# Patient Record
Sex: FEMALE | Race: WHITE
[De-identification: names, ages, dates, MRNs, and addresses within clinical notes are randomized per-mention and may not be internally consistent; named-entity substitution may affect disease eponyms.]

---

## 2019-12-17 ENCOUNTER — HOSPITAL ENCOUNTER (EMERGENCY)
Dept: HOSPITAL 47 - EC | Age: 84
Discharge: HOME | End: 2019-12-17
Payer: MEDICARE

## 2019-12-17 VITALS — DIASTOLIC BLOOD PRESSURE: 52 MMHG | SYSTOLIC BLOOD PRESSURE: 116 MMHG | HEART RATE: 79 BPM

## 2019-12-17 VITALS — RESPIRATION RATE: 16 BRPM | TEMPERATURE: 97.1 F

## 2019-12-17 DIAGNOSIS — N39.0: Primary | ICD-10-CM

## 2019-12-17 DIAGNOSIS — E11.649: ICD-10-CM

## 2019-12-17 DIAGNOSIS — Z79.4: ICD-10-CM

## 2019-12-17 LAB
ANION GAP SERPL CALC-SCNC: 7 MMOL/L
BASOPHILS # BLD AUTO: 0 K/UL (ref 0–0.2)
BASOPHILS NFR BLD AUTO: 1 %
BUN SERPL-SCNC: 26 MG/DL (ref 7–17)
CALCIUM SPEC-MCNC: 9.3 MG/DL (ref 8.4–10.2)
CHLORIDE SERPL-SCNC: 104 MMOL/L (ref 98–107)
CO2 SERPL-SCNC: 25 MMOL/L (ref 22–30)
EOSINOPHIL # BLD AUTO: 0.1 K/UL (ref 0–0.7)
EOSINOPHIL NFR BLD AUTO: 3 %
ERYTHROCYTE [DISTWIDTH] IN BLOOD BY AUTOMATED COUNT: 3.59 M/UL (ref 3.8–5.4)
ERYTHROCYTE [DISTWIDTH] IN BLOOD: 12.2 % (ref 11.5–15.5)
GLUCOSE BLD-MCNC: 107 MG/DL (ref 75–99)
GLUCOSE BLD-MCNC: 209 MG/DL (ref 75–99)
GLUCOSE SERPL-MCNC: 150 MG/DL (ref 74–99)
GLUCOSE UR QL: (no result)
HCT VFR BLD AUTO: 37.8 % (ref 34–46)
HGB BLD-MCNC: 12.3 GM/DL (ref 11.4–16)
HYALINE CASTS UR QL AUTO: 3 /LPF (ref 0–2)
LYMPHOCYTES # SPEC AUTO: 1 K/UL (ref 1–4.8)
LYMPHOCYTES NFR SPEC AUTO: 24 %
MCH RBC QN AUTO: 34.3 PG (ref 25–35)
MCHC RBC AUTO-ENTMCNC: 32.6 G/DL (ref 31–37)
MCV RBC AUTO: 105.3 FL (ref 80–100)
MONOCYTES # BLD AUTO: 0.2 K/UL (ref 0–1)
MONOCYTES NFR BLD AUTO: 5 %
NEUTROPHILS # BLD AUTO: 2.6 K/UL (ref 1.3–7.7)
NEUTROPHILS NFR BLD AUTO: 65 %
PH UR: 5.5 [PH] (ref 5–8)
PLATELET # BLD AUTO: 186 K/UL (ref 150–450)
POTASSIUM SERPL-SCNC: 4.5 MMOL/L (ref 3.5–5.1)
RBC UR QL: 2 /HPF (ref 0–5)
SODIUM SERPL-SCNC: 136 MMOL/L (ref 137–145)
SP GR UR: 1.01 (ref 1–1.03)
SQUAMOUS UR QL AUTO: 1 /HPF (ref 0–4)
UROBILINOGEN UR QL STRIP: <2 MG/DL (ref ?–2)
WBC # BLD AUTO: 3.9 K/UL (ref 3.8–10.6)
WBC # UR AUTO: 30 /HPF (ref 0–5)

## 2019-12-17 PROCEDURE — 36415 COLL VENOUS BLD VENIPUNCTURE: CPT

## 2019-12-17 PROCEDURE — 81001 URINALYSIS AUTO W/SCOPE: CPT

## 2019-12-17 PROCEDURE — 85025 COMPLETE CBC W/AUTO DIFF WBC: CPT

## 2019-12-17 PROCEDURE — 80048 BASIC METABOLIC PNL TOTAL CA: CPT

## 2019-12-17 PROCEDURE — 96374 THER/PROPH/DIAG INJ IV PUSH: CPT

## 2019-12-17 PROCEDURE — 99285 EMERGENCY DEPT VISIT HI MDM: CPT

## 2019-12-17 PROCEDURE — 87086 URINE CULTURE/COLONY COUNT: CPT

## 2019-12-17 NOTE — ED
General Adult HPI





- General


Chief complaint: Recheck/Abnormal Lab/Rx


Stated complaint: hypoglycemia


Time Seen by Provider: 12/17/19 17:32


Source: EMS


Mode of arrival: EMS


Limitations: no limitations





- History of Present Illness


Initial comments: 


Dictation was produced using dragon dictation software. please excuse any 

grammatical, word or spelling errors. 





Chief Complaint: 84-year-old female presents with hyperglycemia.





History of Present Illness: 4-year-old female she has past medical history of 

diabetes.  She is relying on insulin.  Patient takes 8 units of Humalog 3 times 

a day.  Patient also takes 12 units of Lantus at night daily.  Patient states 

she felt very sick earlier this afternoon.  Patient was cleaning the house.  P

atient usually does not exert herself.  She did not think that her sugar was 

low.  EMS was called after the patient's glucose level was measured to be 46.  

Patient states she does not have any other symptoms.  She reports that this 

episode that she is feeling at baseline.








The ROS documented in this emergency department record has been reviewed and 

confirmed by me.  Those systems with pertinent positive or negative responses 

have been documented in the HPI.  All other systems are other negative and/or 

noncontributory.








PHYSICAL EXAM:


General Impression: Alert and oriented x3, not in acute distress


HEENT: Normocephalic atraumatic, extra-ocular movements intact, pupils equal and

reactive to light bilaterally, mucous membranes moist.


Cardiovascular: Heart regular rate and rhythm, S1&S2 audible, no murmurs, rubs 

or gallops


Chest: Lungs clear to auscultation bilaterally, no rhonchi, no wheeze, no rales


Abdomen: Bowel sounds present, abdomen soft, non-tender, non-distended, no 

organomegaly


Musculoskeletal: Pulses present and equal in all extremities, no peripheral 

edema


Motor:  no focal deficits noted


Neurological: CN II-XII grossly intact, no focal motor or sensory deficits noted


Skin: Intact with no visualized rashes


Psych: Normal affect and mood





ED course: 84-year-old female presents with hypoglycemia.  Likely secondary to 

poor intake after insulin administration.  Vital signs upon arrival are within 

acceptable limits.  Patient tolerating by mouth.


Laboratory evaluation obtained.  CBC, metabolic panel unremarkable.  Serial 

blood glucose measurements are within acceptable limits.  Urinalysis consistent 

with urinary tract infection.  Patient given 1 g of Rocephin IV push.  Patient 

prescription for Keflex.  Patient clear for discharge.  Her sugars are stable.  

Patient is well-appearing understandable and agreeable to disposition.











- Related Data


                                  Previous Rx's











 Medication  Instructions  Recorded


 


Cephalexin [Keflex] 500 mg PO Q6HR 7 Days #28 cap 12/17/19











                                    Allergies











Allergy/AdvReac Type Severity Reaction Status Date / Time


 


No Known Allergies Allergy   Verified 12/17/19 17:31














Review of Systems


ROS Statement: 


Those systems with pertinent positive or pertinent negative responses have been 

documented in the HPI.





ROS Other: All systems not noted in ROS Statement are negative.





Past Medical History


Past Medical History: Diabetes Mellitus, Hyperlipidemia, Hypertension


History of Any Multi-Drug Resistant Organisms: None Reported


Past Surgical History: Tonsillectomy


Past Psychological History: No Psychological Hx Reported


Smoking Status: Never smoker


Past Alcohol Use History: None Reported


Past Drug Use History: None Reported





General Exam


Limitations: no limitations





Course


                                   Vital Signs











  12/17/19





  17:27


 


Temperature 97.1 F L


 


Pulse Rate 85


 


Respiratory 16





Rate 


 


Blood Pressure 105/77


 


O2 Sat by Pulse 100





Oximetry 














Medical Decision Making





- Lab Data


Result diagrams: 


                                 12/17/19 17:45





                                 12/17/19 17:45


                                   Lab Results











  12/17/19 12/17/19 12/17/19 Range/Units





  17:40 17:45 17:45 


 


WBC    3.9  (3.8-10.6)  k/uL


 


RBC    3.59 L  (3.80-5.40)  m/uL


 


Hgb    12.3  (11.4-16.0)  gm/dL


 


Hct    37.8  (34.0-46.0)  %


 


MCV    105.3 H  (80.0-100.0)  fL


 


MCH    34.3  (25.0-35.0)  pg


 


MCHC    32.6  (31.0-37.0)  g/dL


 


RDW    12.2  (11.5-15.5)  %


 


Plt Count    186  (150-450)  k/uL


 


Neutrophils %    65  %


 


Lymphocytes %    24  %


 


Monocytes %    5  %


 


Eosinophils %    3  %


 


Basophils %    1  %


 


Neutrophils #    2.6  (1.3-7.7)  k/uL


 


Lymphocytes #    1.0  (1.0-4.8)  k/uL


 


Monocytes #    0.2  (0-1.0)  k/uL


 


Eosinophils #    0.1  (0-0.7)  k/uL


 


Basophils #    0.0  (0-0.2)  k/uL


 


Macrocytosis    Slight  


 


Sodium   136 L   (137-145)  mmol/L


 


Potassium   4.5   (3.5-5.1)  mmol/L


 


Chloride   104   ()  mmol/L


 


Carbon Dioxide   25   (22-30)  mmol/L


 


Anion Gap   7   mmol/L


 


BUN   26 H   (7-17)  mg/dL


 


Creatinine   0.91   (0.52-1.04)  mg/dL


 


Est GFR (CKD-EPI)AfAm   67   (>60 ml/min/1.73 sqM)  


 


Est GFR (CKD-EPI)NonAf   58   (>60 ml/min/1.73 sqM)  


 


Glucose   150 H   (74-99)  mg/dL


 


POC Glucose (mg/dL)  107 H    (75-99)  mg/dL


 


POC Glu Operater ID  Schomaker, April    


 


Calcium   9.3   (8.4-10.2)  mg/dL


 


Urine Color     


 


Urine Appearance     (Clear)  


 


Urine pH     (5.0-8.0)  


 


Ur Specific Gravity     (1.001-1.035)  


 


Urine Protein     (Negative)  


 


Urine Glucose (UA)     (Negative)  


 


Urine Ketones     (Negative)  


 


Urine Blood     (Negative)  


 


Urine Nitrite     (Negative)  


 


Urine Bilirubin     (Negative)  


 


Urine Urobilinogen     (<2.0)  mg/dL


 


Ur Leukocyte Esterase     (Negative)  


 


Urine RBC     (0-5)  /hpf


 


Urine WBC     (0-5)  /hpf


 


Ur Squamous Epith Cells     (0-4)  /hpf


 


Hyaline Casts     (0-2)  /lpf


 


Urine Mucus     (None)  /hpf














  12/17/19 12/17/19 Range/Units





  18:41 19:05 


 


WBC    (3.8-10.6)  k/uL


 


RBC    (3.80-5.40)  m/uL


 


Hgb    (11.4-16.0)  gm/dL


 


Hct    (34.0-46.0)  %


 


MCV    (80.0-100.0)  fL


 


MCH    (25.0-35.0)  pg


 


MCHC    (31.0-37.0)  g/dL


 


RDW    (11.5-15.5)  %


 


Plt Count    (150-450)  k/uL


 


Neutrophils %    %


 


Lymphocytes %    %


 


Monocytes %    %


 


Eosinophils %    %


 


Basophils %    %


 


Neutrophils #    (1.3-7.7)  k/uL


 


Lymphocytes #    (1.0-4.8)  k/uL


 


Monocytes #    (0-1.0)  k/uL


 


Eosinophils #    (0-0.7)  k/uL


 


Basophils #    (0-0.2)  k/uL


 


Macrocytosis    


 


Sodium    (137-145)  mmol/L


 


Potassium    (3.5-5.1)  mmol/L


 


Chloride    ()  mmol/L


 


Carbon Dioxide    (22-30)  mmol/L


 


Anion Gap    mmol/L


 


BUN    (7-17)  mg/dL


 


Creatinine    (0.52-1.04)  mg/dL


 


Est GFR (CKD-EPI)AfAm    (>60 ml/min/1.73 sqM)  


 


Est GFR (CKD-EPI)NonAf    (>60 ml/min/1.73 sqM)  


 


Glucose    (74-99)  mg/dL


 


POC Glucose (mg/dL)  209 H   (75-99)  mg/dL


 


POC Glu Operater ID  Schomaker, April   


 


Calcium    (8.4-10.2)  mg/dL


 


Urine Color   Yellow  


 


Urine Appearance   Clear  (Clear)  


 


Urine pH   5.5  (5.0-8.0)  


 


Ur Specific Gravity   1.011  (1.001-1.035)  


 


Urine Protein   Trace H  (Negative)  


 


Urine Glucose (UA)   1+ H  (Negative)  


 


Urine Ketones   Negative  (Negative)  


 


Urine Blood   Trace H  (Negative)  


 


Urine Nitrite   Negative  (Negative)  


 


Urine Bilirubin   Negative  (Negative)  


 


Urine Urobilinogen   <2.0  (<2.0)  mg/dL


 


Ur Leukocyte Esterase   Large H  (Negative)  


 


Urine RBC   2  (0-5)  /hpf


 


Urine WBC   30 H  (0-5)  /hpf


 


Ur Squamous Epith Cells   1  (0-4)  /hpf


 


Hyaline Casts   3 H  (0-2)  /lpf


 


Urine Mucus   Occasional H  (None)  /hpf














Disposition


Clinical Impression: 


 Hypoglycemia, UTI (urinary tract infection)





Disposition: HOME SELF-CARE


Condition: Good


Instructions (If sedation given, give patient instructions):  Hypoglycemia in a 

Person with Diabetes (ED)


Prescriptions: 


Cephalexin [Keflex] 500 mg PO Q6HR 7 Days #28 cap


Is patient prescribed a controlled substance at d/c from ED?: No


Referrals: 


Zac Amos MD [Primary Care Provider] - 1-2 days


Time of Disposition: 19:41

## 2020-07-23 ENCOUNTER — HOSPITAL ENCOUNTER (EMERGENCY)
Dept: HOSPITAL 47 - EC | Age: 85
Discharge: HOME | End: 2020-07-23
Payer: MEDICARE

## 2020-07-23 VITALS — SYSTOLIC BLOOD PRESSURE: 128 MMHG | HEART RATE: 78 BPM | TEMPERATURE: 98 F | DIASTOLIC BLOOD PRESSURE: 59 MMHG

## 2020-07-23 VITALS — RESPIRATION RATE: 18 BRPM

## 2020-07-23 DIAGNOSIS — R10.12: ICD-10-CM

## 2020-07-23 DIAGNOSIS — R07.81: Primary | ICD-10-CM
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
## 2020-07-23 DIAGNOSIS — E11.9: ICD-10-CM

## 2020-07-23 DIAGNOSIS — N64.4: ICD-10-CM
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
## 2020-07-23 LAB
ALBUMIN SERPL-MCNC: 4.6 G/DL (ref 3.5–5)
ALP SERPL-CCNC: 107 U/L (ref 38–126)
ALT SERPL-CCNC: 11 U/L (ref 4–34)
ANION GAP SERPL CALC-SCNC: 11 MMOL/L
AST SERPL-CCNC: 24 U/L (ref 14–36)
BASOPHILS # BLD AUTO: 0 K/UL (ref 0–0.2)
BASOPHILS NFR BLD AUTO: 0 %
BUN SERPL-SCNC: 34 MG/DL (ref 7–17)
CALCIUM SPEC-MCNC: 9.6 MG/DL (ref 8.4–10.2)
CHLORIDE SERPL-SCNC: 99 MMOL/L (ref 98–107)
CO2 SERPL-SCNC: 24 MMOL/L (ref 22–30)
EOSINOPHIL # BLD AUTO: 0 K/UL (ref 0–0.7)
EOSINOPHIL NFR BLD AUTO: 0 %
ERYTHROCYTE [DISTWIDTH] IN BLOOD BY AUTOMATED COUNT: 3.89 M/UL (ref 3.8–5.4)
ERYTHROCYTE [DISTWIDTH] IN BLOOD: 12.2 % (ref 11.5–15.5)
GLUCOSE SERPL-MCNC: 369 MG/DL (ref 74–99)
HCT VFR BLD AUTO: 41.2 % (ref 34–46)
HGB BLD-MCNC: 13.2 GM/DL (ref 11.4–16)
INR PPP: 0.9 (ref ?–1.2)
LYMPHOCYTES # SPEC AUTO: 0.6 K/UL (ref 1–4.8)
LYMPHOCYTES NFR SPEC AUTO: 9 %
MCH RBC QN AUTO: 34.1 PG (ref 25–35)
MCHC RBC AUTO-ENTMCNC: 32.1 G/DL (ref 31–37)
MCV RBC AUTO: 106.1 FL (ref 80–100)
MONOCYTES # BLD AUTO: 0.2 K/UL (ref 0–1)
MONOCYTES NFR BLD AUTO: 3 %
NEUTROPHILS # BLD AUTO: 5.4 K/UL (ref 1.3–7.7)
NEUTROPHILS NFR BLD AUTO: 87 %
PLATELET # BLD AUTO: 195 K/UL (ref 150–450)
POTASSIUM SERPL-SCNC: 4.8 MMOL/L (ref 3.5–5.1)
PROT SERPL-MCNC: 7.3 G/DL (ref 6.3–8.2)
PT BLD: 9.9 SEC (ref 9–12)
SODIUM SERPL-SCNC: 134 MMOL/L (ref 137–145)
WBC # BLD AUTO: 6.2 K/UL (ref 3.8–10.6)

## 2020-07-23 PROCEDURE — 99284 EMERGENCY DEPT VISIT MOD MDM: CPT

## 2020-07-23 PROCEDURE — 93005 ELECTROCARDIOGRAM TRACING: CPT

## 2020-07-23 PROCEDURE — 70450 CT HEAD/BRAIN W/O DYE: CPT

## 2020-07-23 PROCEDURE — 85610 PROTHROMBIN TIME: CPT

## 2020-07-23 PROCEDURE — 80053 COMPREHEN METABOLIC PANEL: CPT

## 2020-07-23 PROCEDURE — 85025 COMPLETE CBC W/AUTO DIFF WBC: CPT

## 2020-07-23 PROCEDURE — 36415 COLL VENOUS BLD VENIPUNCTURE: CPT

## 2020-07-23 PROCEDURE — 72125 CT NECK SPINE W/O DYE: CPT

## 2020-07-23 NOTE — XR
PROCEDURE: XR ribs LT w pa chest xray - 5 views

DATE AND TIME: 7/23/2020 7:07 PM

 

CLINICAL INDICATION: PHH; fall, left-sided pain

 

TECHNIQUE: Department protocol

 

COMPARISON: None

 

FINDINGS: There is no fracture or malalignment. No pneumothorax or pleural effusion. No incidental fi
ndings.

 

IMPRESSION:

NO ACUTE PROCESS.

## 2020-07-23 NOTE — ED
Fall HPI





- General


Chief Complaint: Fall


Stated Complaint: Fall


Time Seen by Provider: 07/23/20 17:50


Source: patient


Mode of arrival: ambulatory





- History of Present Illness


Initial Comments: 





Patient is a 85-year-old female presenting to the emergency department with a 

chief complaint of a fall.  Patient states she got up suddenly from a standing 

position, became dizzy and fell on the left side of her ribs on the armrest on 

her loveseat which is cushioned.  Patient states this occurred several hours 

prior Toradol.  Patient reports there was no loss of consciousness or head 

injury.  Patient states now she has left-sided rib pain that is mostly located 

in the left upper flank region.  Patient reports pain with full inspiration and 

is alleviated when she takes shallow breaths.  Patient denies taking medication 

to alleviate the symptoms.  She denies any shortness of breath with mid 

vertebral back pain.





- Related Data


                                  Previous Rx's











 Medication  Instructions  Recorded


 


Cephalexin [Keflex] 500 mg PO Q6HR 7 Days #28 cap 12/17/19











                                    Allergies











Allergy/AdvReac Type Severity Reaction Status Date / Time


 


No Known Allergies Allergy   Verified 07/23/20 16:49














Review of Systems


ROS Statement: 


Those systems with pertinent positive or pertinent negative responses have been 

documented in the HPI.





ROS Other: All systems not noted in ROS Statement are negative.





Past Medical History


Past Medical History: Diabetes Mellitus, Hyperlipidemia, Hypertension


History of Any Multi-Drug Resistant Organisms: None Reported


Past Surgical History: Tonsillectomy


Past Psychological History: No Psychological Hx Reported


Smoking Status: Never smoker


Past Alcohol Use History: None Reported


Past Drug Use History: None Reported





General Exam


Limitations: no limitations


General appearance: alert, in no apparent distress


Head exam: Present: atraumatic, normocephalic, normal inspection


Eye exam: Present: normal appearance, PERRL, EOMI


Pupils: Present: normal accommodation


ENT exam: Present: normal exam, normal oropharynx, mucous membranes moist, TM's 

normal bilaterally, normal external ear exam


Neck exam: Present: normal inspection, full ROM.  Absent: tenderness


Respiratory exam: Present: normal lung sounds bilaterally, chest wall tenderness

 (Left upper flank region tenderness as well as tenderness on the lateral aspect

 under the left breast.).  Absent: respiratory distress, wheezes, rales, 

decreased breath sounds, prolonged expiratory


GI/Abdominal exam: Present: soft, normal bowel sounds.  Absent: distended, 

tenderness (No left upper quadrant abdominal pain.), guarding, rebound


Extremities exam: Present: normal inspection, full ROM.  Absent: tenderness


Back exam: Present: normal inspection, full ROM.  Absent: tenderness, CVA 

tenderness (R), CVA tenderness (L)


Neurological exam: Present: alert, oriented X3


Psychiatric exam: Present: normal affect, normal mood


Skin exam: Present: warm, dry, intact, normal color





Course


                                   Vital Signs











  07/23/20





  16:45


 


Temperature 98.2 F


 


Pulse Rate 83


 


Respiratory 18





Rate 


 


Blood Pressure 122/66


 


O2 Sat by Pulse 99





Oximetry 














Medical Decision Making





- Medical Decision Making





Patient is a 85-year-old female presenting to the emergency department chief 

complaint of a fall.  Patient is not on blood thinners.  Exam patient has left u

pper flank tenderness with slight tenderness under the lateral chest wall on her

 left breast.  Pain is exacerbated with full inspiration.  No midline vertebral 

tenderness or abdominal tenderness.  Chest x-ray with left-sided ribs is 

negative for a pneumothorax or a fracture.  CT of brain and cervical spine is 

negative for any acute processes.  CBC is unremarkable.  CMP reveals a blood 

glucose of 360.  Patient is diabetic and has not taken her insulin today.  INR 

levels within normal limits.  Patient given an incentive spirometer.  Patient 

also given a Tylenol 3 starter pack and advised not to drive or operative heavy 

machinery when taking medication.  She was advised to alternate between Tylenol 

and Motrin for pain control.  She was advised to follow with the primary care.  

Return prescribed with her list is a patient does understand real.  Case 

discussed with physician.





- Lab Data


Result diagrams: 


                                 07/23/20 18:26





                                 07/23/20 18:26


                                   Lab Results











  07/23/20 07/23/20 07/23/20 Range/Units





  18:26 18:26 18:26 


 


WBC  6.2    (3.8-10.6)  k/uL


 


RBC  3.89    (3.80-5.40)  m/uL


 


Hgb  13.2    (11.4-16.0)  gm/dL


 


Hct  41.2    (34.0-46.0)  %


 


MCV  106.1 H    (80.0-100.0)  fL


 


MCH  34.1    (25.0-35.0)  pg


 


MCHC  32.1    (31.0-37.0)  g/dL


 


RDW  12.2    (11.5-15.5)  %


 


Plt Count  195    (150-450)  k/uL


 


Neutrophils %  87    %


 


Lymphocytes %  9    %


 


Monocytes %  3    %


 


Eosinophils %  0    %


 


Basophils %  0    %


 


Neutrophils #  5.4    (1.3-7.7)  k/uL


 


Lymphocytes #  0.6 L    (1.0-4.8)  k/uL


 


Monocytes #  0.2    (0-1.0)  k/uL


 


Eosinophils #  0.0    (0-0.7)  k/uL


 


Basophils #  0.0    (0-0.2)  k/uL


 


Macrocytosis  Slight    


 


PT   9.9   (9.0-12.0)  sec


 


INR   0.9   (<1.2)  


 


Sodium    134 L  (137-145)  mmol/L


 


Potassium    4.8  (3.5-5.1)  mmol/L


 


Chloride    99  ()  mmol/L


 


Carbon Dioxide    24  (22-30)  mmol/L


 


Anion Gap    11  mmol/L


 


BUN    34 H  (7-17)  mg/dL


 


Creatinine    1.01  (0.52-1.04)  mg/dL


 


Est GFR (CKD-EPI)AfAm    59  (>60 ml/min/1.73 sqM)  


 


Est GFR (CKD-EPI)NonAf    51  (>60 ml/min/1.73 sqM)  


 


Glucose    369 H  (74-99)  mg/dL


 


Calcium    9.6  (8.4-10.2)  mg/dL


 


Total Bilirubin    0.6  (0.2-1.3)  mg/dL


 


AST    24  (14-36)  U/L


 


ALT    11  (4-34)  U/L


 


Alkaline Phosphatase    107  ()  U/L


 


Total Protein    7.3  (6.3-8.2)  g/dL


 


Albumin    4.6  (3.5-5.0)  g/dL














- EKG Data


EKG Comments: 





Sinus rhythm with no ST or T-wave changes.


Ventricular rate 84, , QRS 90, QTc 451.





Disposition


Clinical Impression: 


 Fall, Rib pain on left side





Disposition: HOME SELF-CARE


Condition: Stable


Instructions (If sedation given, give patient instructions):  Fall Prevention 

(ED)


Additional Instructions: 


Use incentive spirometer.  Take prescribed medication as directed.  Do not drive

 or operate heavy machinery when taking the medication.  Follow with the primary

 care.  Return to emergency department if symptoms worsen.


Is patient prescribed a controlled substance at d/c from ED?: No


Referrals: 


Zac Amos MD [Primary Care Provider] - 1-2 days


Time of Disposition: 20:15

## 2020-07-23 NOTE — CT
EXAMINATION TYPE: CT brain damienine wo con

 

DATE OF EXAM: 7/23/2020

 

COMPARISON: None

 

HISTORY: Fall; pain.

 

CT DLP: 1330.9 mGycm

Automated exposure control for dose reduction was used.

 

TECHNIQUE: CT scan of the head and cervical spine are performed without contrast.

 

FINDINGS:   There is no acute intracranial hemorrhage, mass effect, or midline shift identified.  The
 ventricles and sulci are within normal limits in size.  The globes are intact and the visualized sin
uses are clear.

 

Cervical spine is visualized in its entirety from C1 through upper thoracic levels and demonstrates s
atisfactory alignment without evidence of acute fracture or dislocation.  Prevertebral soft tissue ap
pears within normal limits.  The C1-C2 articulation is unremarkable.  

 

IMPRESSION:

1. There is no acute fracture or dislocation evident in the cervical spine.

2. No acute intracranial hemorrhage, mass effect, or midline shift is seen.

## 2022-11-22 ENCOUNTER — HOSPITAL ENCOUNTER (INPATIENT)
Dept: HOSPITAL 47 - EC | Age: 87
LOS: 3 days | Discharge: HOME | DRG: 639 | End: 2022-11-25
Attending: HOSPITALIST | Admitting: HOSPITALIST
Payer: MEDICARE

## 2022-11-22 VITALS — BODY MASS INDEX: 27.5 KG/M2

## 2022-11-22 DIAGNOSIS — E78.5: ICD-10-CM

## 2022-11-22 DIAGNOSIS — D63.1: ICD-10-CM

## 2022-11-22 DIAGNOSIS — I10: ICD-10-CM

## 2022-11-22 DIAGNOSIS — E86.0: ICD-10-CM

## 2022-11-22 DIAGNOSIS — J06.9: ICD-10-CM

## 2022-11-22 DIAGNOSIS — N18.31: ICD-10-CM

## 2022-11-22 DIAGNOSIS — I49.1: ICD-10-CM

## 2022-11-22 DIAGNOSIS — E11.10: Primary | ICD-10-CM

## 2022-11-22 DIAGNOSIS — Z79.4: ICD-10-CM

## 2022-11-22 LAB
ALBUMIN SERPL-MCNC: 4.2 G/DL (ref 3.5–5)
ALP SERPL-CCNC: 122 U/L (ref 38–126)
ALT SERPL-CCNC: 34 U/L (ref 4–34)
ANION GAP SERPL CALC-SCNC: 16 MMOL/L
APTT BLD: 19 SEC (ref 22–30)
AST SERPL-CCNC: 45 U/L (ref 14–36)
BASOPHILS # BLD AUTO: 0 K/UL (ref 0–0.2)
BASOPHILS NFR BLD AUTO: 0 %
BUN SERPL-SCNC: 30 MG/DL (ref 7–17)
CALCIUM SPEC-MCNC: 9.1 MG/DL (ref 8.4–10.2)
CHLORIDE SERPL-SCNC: 103 MMOL/L (ref 98–107)
CO2 SERPL-SCNC: 20 MMOL/L (ref 22–30)
EOSINOPHIL # BLD AUTO: 0.1 K/UL (ref 0–0.7)
EOSINOPHIL NFR BLD AUTO: 1 %
ERYTHROCYTE [DISTWIDTH] IN BLOOD BY AUTOMATED COUNT: 3.21 M/UL (ref 3.8–5.4)
ERYTHROCYTE [DISTWIDTH] IN BLOOD: 12 % (ref 11.5–15.5)
GLUCOSE BLD-MCNC: 309 MG/DL (ref 70–110)
GLUCOSE BLD-MCNC: 389 MG/DL (ref 70–110)
GLUCOSE SERPL-MCNC: 403 MG/DL (ref 74–99)
GLUCOSE UR QL: (no result)
HCT VFR BLD AUTO: 33.8 % (ref 34–46)
HGB BLD-MCNC: 11.6 GM/DL (ref 11.4–16)
HYALINE CASTS UR QL AUTO: 12 /LPF (ref 0–2)
INR PPP: 0.9 (ref ?–1.2)
KETONES UR QL STRIP.AUTO: (no result)
LYMPHOCYTES # SPEC AUTO: 0.3 K/UL (ref 1–4.8)
LYMPHOCYTES NFR SPEC AUTO: 4 %
MCH RBC QN AUTO: 36.1 PG (ref 25–35)
MCHC RBC AUTO-ENTMCNC: 34.3 G/DL (ref 31–37)
MCV RBC AUTO: 105.3 FL (ref 80–100)
MONOCYTES # BLD AUTO: 0.4 K/UL (ref 0–1)
MONOCYTES NFR BLD AUTO: 5 %
NEUTROPHILS # BLD AUTO: 7.6 K/UL (ref 1.3–7.7)
NEUTROPHILS NFR BLD AUTO: 90 %
PH UR: 5 [PH] (ref 5–8)
PLATELET # BLD AUTO: 160 K/UL (ref 150–450)
POTASSIUM SERPL-SCNC: 4.4 MMOL/L (ref 3.5–5.1)
PROT SERPL-MCNC: 6.4 G/DL (ref 6.3–8.2)
PT BLD: 10.3 SEC (ref 9–12)
RBC UR QL: <1 /HPF (ref 0–5)
SODIUM SERPL-SCNC: 139 MMOL/L (ref 137–145)
SP GR UR: 1.03 (ref 1–1.03)
UROBILINOGEN UR QL STRIP: <2 MG/DL (ref ?–2)
WBC # BLD AUTO: 8.5 K/UL (ref 3.8–10.6)
WBC # UR AUTO: 1 /HPF (ref 0–5)

## 2022-11-22 PROCEDURE — 85610 PROTHROMBIN TIME: CPT

## 2022-11-22 PROCEDURE — 85025 COMPLETE CBC W/AUTO DIFF WBC: CPT

## 2022-11-22 PROCEDURE — 81001 URINALYSIS AUTO W/SCOPE: CPT

## 2022-11-22 PROCEDURE — 85730 THROMBOPLASTIN TIME PARTIAL: CPT

## 2022-11-22 PROCEDURE — 84520 ASSAY OF UREA NITROGEN: CPT

## 2022-11-22 PROCEDURE — 83605 ASSAY OF LACTIC ACID: CPT

## 2022-11-22 PROCEDURE — 80053 COMPREHEN METABOLIC PANEL: CPT

## 2022-11-22 PROCEDURE — 80048 BASIC METABOLIC PNL TOTAL CA: CPT

## 2022-11-22 PROCEDURE — 93005 ELECTROCARDIOGRAM TRACING: CPT

## 2022-11-22 PROCEDURE — 82009 KETONE BODYS QUAL: CPT

## 2022-11-22 PROCEDURE — 80051 ELECTROLYTE PANEL: CPT

## 2022-11-22 PROCEDURE — 71045 X-RAY EXAM CHEST 1 VIEW: CPT

## 2022-11-22 PROCEDURE — 84100 ASSAY OF PHOSPHORUS: CPT

## 2022-11-22 PROCEDURE — 99291 CRITICAL CARE FIRST HOUR: CPT

## 2022-11-22 PROCEDURE — 96361 HYDRATE IV INFUSION ADD-ON: CPT

## 2022-11-22 PROCEDURE — 87040 BLOOD CULTURE FOR BACTERIA: CPT

## 2022-11-22 PROCEDURE — 83036 HEMOGLOBIN GLYCOSYLATED A1C: CPT

## 2022-11-22 PROCEDURE — 36415 COLL VENOUS BLD VENIPUNCTURE: CPT

## 2022-11-22 PROCEDURE — 96374 THER/PROPH/DIAG INJ IV PUSH: CPT

## 2022-11-22 PROCEDURE — 87651 STREP A DNA AMP PROBE: CPT

## 2022-11-22 PROCEDURE — 82565 ASSAY OF CREATININE: CPT

## 2022-11-22 PROCEDURE — 87636 SARSCOV2 & INF A&B AMP PRB: CPT

## 2022-11-22 PROCEDURE — 82947 ASSAY GLUCOSE BLOOD QUANT: CPT

## 2022-11-22 PROCEDURE — 71046 X-RAY EXAM CHEST 2 VIEWS: CPT

## 2022-11-22 RX ADMIN — NICARDIPINE HYDROCHLORIDE SCH MLS/HR: 2.5 INJECTION INTRAVENOUS at 18:19

## 2022-11-22 RX ADMIN — INSULIN HUMAN SCH MLS/HR: 100 INJECTION, SOLUTION PARENTERAL at 20:38

## 2022-11-22 RX ADMIN — CEFAZOLIN SCH MLS/HR: 330 INJECTION, POWDER, FOR SOLUTION INTRAMUSCULAR; INTRAVENOUS at 20:46

## 2022-11-22 RX ADMIN — NICARDIPINE HYDROCHLORIDE SCH MLS/HR: 2.5 INJECTION INTRAVENOUS at 18:20

## 2022-11-22 NOTE — ED
General Adult HPI





- General


Chief complaint: Upper Respiratory Infection


Stated complaint: High Blood Sugar


Time Seen by Provider: 11/22/22 17:51


Source: patient, EMS, RN notes reviewed, old records reviewed


Mode of arrival: EMS


Limitations: no limitations





- History of Present Illness


Initial comments: 





This is an 87-year-old female who was sent in because her sugar was extremely 

high.  According to EMS her sugar was over 550.  Patient herself has been 

feeling tired lately complains of a sore throat other than that she has no 

complaints per patient denies chest pain or difficulty breathing.  Patient 

denies any abdominal pain patient denies nausea vomiting diarrhea.  Patient 

denies any recent injury or trauma.  Patient does not report any fever.  Patient

denies any cough.  Patient denies headache patient denies numbness weakness per 

patient denies lightheadedness or dizziness.  Patient denies any dysuria 

hematuria urinary frequency.





- Related Data


                                  Previous Rx's











 Medication  Instructions  Recorded


 


Cephalexin [Keflex] 500 mg PO Q6HR 7 Days #28 cap 12/17/19











                                    Allergies











Allergy/AdvReac Type Severity Reaction Status Date / Time


 


No Known Allergies Allergy   Verified 07/23/20 16:49














Review of Systems


ROS Statement: 


Those systems with pertinent positive or pertinent negative responses have been 

documented in the HPI.





ROS Other: All systems not noted in ROS Statement are negative.





Past Medical History


Past Medical History: Diabetes Mellitus, Hyperlipidemia, Hypertension


History of Any Multi-Drug Resistant Organisms: None Reported


Past Surgical History: Tonsillectomy


Past Psychological History: No Psychological Hx Reported


Smoking Status: Never smoker


Past Alcohol Use History: None Reported


Past Drug Use History: None Reported





General Exam





- General Exam Comments


Initial Comments: 





GENERAL:


Patient is well-developed and well-nourished.  Patient is nontoxic and well-

hydrated and is in mild distress.





ENT:


Neck is soft and supple.  No significant lymphadenopathy is noted.  Oropharynx 

is clear with no signs of infection throat.  Dry mucous membranes.  Neck has 

full range of motion without eliciting any pain. 





EYES:


The sclera were anicteric and conjunctiva were pink and moist.  Extraocular 

movements were intact and pupils were equal round and reactive to light.  

Eyelids were unremarkable.





PULMONARY:


Unlabored respirations.  Good breath sounds bilaterally.  No audible rales 

rhonchi or wheezing was noted.





CARDIOVASCULAR:


There is a regular rate and rhythm without any murmurs gallops or rubs.





ABDOMEN:


Soft and nontender with normal bowel sounds.  





SKIN:


Skin is clear with no lesions or rashes and otherwise unremarkable.





NEUROLOGIC:


Patient is alert and oriented x3.  Cranial nerves II through XII are grossly 

intact.  Motor and sensory are also intact.  Normal speech, volume and content. 

 Symmetrical smile. 





MUSCULOSKELETAL:


Normal extremities with adequate strength and full range of motion.  





LYMPHATICS:


No significant lymphadenopathy is noted





PSYCHIATRIC:


Normal psychiatric evaluation. 


Limitations: no limitations





Course


                                   Vital Signs











  11/22/22





  17:57


 


Temperature 100.1 F H


 


Pulse Rate 101 H


 


Respiratory 16





Rate 


 


Blood Pressure 126/50


 


O2 Sat by Pulse 100





Oximetry 














Medical Decision Making





- Medical Decision Making





I interpreted EKG.  EKG shows sinus rhythm with frequent PACs at a rate of 92 

bpm CT interval is 180 when she rests is 95 Q-T intervals 343 QTC is 393.  

Patient's EKG shows no ST segment elevation or depression.





I interpreted the chest x-ray.  Chest x-ray shows no acute normalities.





Patient's sugar was over 400 I started the patient on an insulin drip after I 

gave the patient insulin bolus.





[facility] 

the patient wrote admitting orders I continued insulin drip on the floor.





- Lab Data


Result diagrams: 


                                 11/22/22 19:05





                                 11/22/22 19:05


                                   Lab Results











  11/22/22 11/22/22 11/22/22 Range/Units





  18:23 19:05 19:05 


 


WBC   8.5   (3.8-10.6)  k/uL


 


RBC   3.21 L   (3.80-5.40)  m/uL


 


Hgb   11.6   (11.4-16.0)  gm/dL


 


Hct   33.8 L   (34.0-46.0)  %


 


MCV   105.3 H   (80.0-100.0)  fL


 


MCH   36.1 H   (25.0-35.0)  pg


 


MCHC   34.3   (31.0-37.0)  g/dL


 


RDW   12.0   (11.5-15.5)  %


 


Plt Count   160   (150-450)  k/uL


 


MPV   8.4   


 


Neutrophils %   90   %


 


Lymphocytes %   4   %


 


Monocytes %   5   %


 


Eosinophils %   1   %


 


Basophils %   0   %


 


Neutrophils #   7.6   (1.3-7.7)  k/uL


 


Lymphocytes #   0.3 L   (1.0-4.8)  k/uL


 


Monocytes #   0.4   (0-1.0)  k/uL


 


Eosinophils #   0.1   (0-0.7)  k/uL


 


Basophils #   0.0   (0-0.2)  k/uL


 


Macrocytosis   Slight   


 


Sodium    139  (137-145)  mmol/L


 


Potassium    4.4  (3.5-5.1)  mmol/L


 


Chloride    103  ()  mmol/L


 


Carbon Dioxide    20 L  (22-30)  mmol/L


 


Anion Gap    16  mmol/L


 


BUN    30 H  (7-17)  mg/dL


 


Creatinine    1.19 H  (0.52-1.04)  mg/dL


 


Est GFR (CKD-EPI)AfAm    47  (>60 ml/min/1.73 sqM)  


 


Est GFR (CKD-EPI)NonAf    41  (>60 ml/min/1.73 sqM)  


 


Glucose    403 H  (74-99)  mg/dL


 


Plasma Lactic Acid Rafi     (0.7-2.0)  mmol/L


 


Calcium    9.1  (8.4-10.2)  mg/dL


 


Total Bilirubin    0.8  (0.2-1.3)  mg/dL


 


AST    45 H  (14-36)  U/L


 


ALT    34  (4-34)  U/L


 


Alkaline Phosphatase    122  ()  U/L


 


Total Protein    6.4  (6.3-8.2)  g/dL


 


Albumin    4.2  (3.5-5.0)  g/dL


 


Acetone, Qual    Positive  (Negative)  


 


Influenza Type A (PCR)  Not Detected    (Not Detectd)  


 


Influenza Type B (PCR)  Not Detected    (Not Detectd)  


 


RSV (PCR)  Not Detected    (Not Detectd)  


 


SARS-CoV-2 (PCR)  Not Detected    (Not Detectd)  


 


Group A Strep (PCR)     (Not Detectd)  














  11/22/22 11/22/22 Range/Units





  19:05 19:05 


 


WBC    (3.8-10.6)  k/uL


 


RBC    (3.80-5.40)  m/uL


 


Hgb    (11.4-16.0)  gm/dL


 


Hct    (34.0-46.0)  %


 


MCV    (80.0-100.0)  fL


 


MCH    (25.0-35.0)  pg


 


MCHC    (31.0-37.0)  g/dL


 


RDW    (11.5-15.5)  %


 


Plt Count    (150-450)  k/uL


 


MPV    


 


Neutrophils %    %


 


Lymphocytes %    %


 


Monocytes %    %


 


Eosinophils %    %


 


Basophils %    %


 


Neutrophils #    (1.3-7.7)  k/uL


 


Lymphocytes #    (1.0-4.8)  k/uL


 


Monocytes #    (0-1.0)  k/uL


 


Eosinophils #    (0-0.7)  k/uL


 


Basophils #    (0-0.2)  k/uL


 


Macrocytosis    


 


Sodium    (137-145)  mmol/L


 


Potassium    (3.5-5.1)  mmol/L


 


Chloride    ()  mmol/L


 


Carbon Dioxide    (22-30)  mmol/L


 


Anion Gap    mmol/L


 


BUN    (7-17)  mg/dL


 


Creatinine    (0.52-1.04)  mg/dL


 


Est GFR (CKD-EPI)AfAm    (>60 ml/min/1.73 sqM)  


 


Est GFR (CKD-EPI)NonAf    (>60 ml/min/1.73 sqM)  


 


Glucose    (74-99)  mg/dL


 


Plasma Lactic Acid Rafi  2.9 H*   (0.7-2.0)  mmol/L


 


Calcium    (8.4-10.2)  mg/dL


 


Total Bilirubin    (0.2-1.3)  mg/dL


 


AST    (14-36)  U/L


 


ALT    (4-34)  U/L


 


Alkaline Phosphatase    ()  U/L


 


Total Protein    (6.3-8.2)  g/dL


 


Albumin    (3.5-5.0)  g/dL


 


Acetone, Qual    (Negative)  


 


Influenza Type A (PCR)    (Not Detectd)  


 


Influenza Type B (PCR)    (Not Detectd)  


 


RSV (PCR)    (Not Detectd)  


 


SARS-CoV-2 (PCR)    (Not Detectd)  


 


Group A Strep (PCR)   NOT DETECTED  (Not Detectd)  














Critical Care Time


Critical Care Time: Yes


Total Critical Care Time: 35





Disposition


Clinical Impression: 


 Diabetic ketoacidosis





Disposition: ADMITTED AS IP TO THIS HOSP


Referrals: 


Zac Amos MD [Primary Care Provider] - 1-2 days


Time of Disposition: 20:08

## 2022-11-22 NOTE — XR
EXAMINATION TYPE: XR chest 2V

 

DATE OF EXAM: 11/22/2022

 

COMPARISON: 7/23/2020

 

HISTORY: Fever

 

TECHNIQUE:

 

FINDINGS: Heart is normal. Lungs are clear of infiltrate. No heart failure. There are no hilar masses
. There are chest leads. Bony thorax is intact.

 

IMPRESSION: No active cardiopulmonary disease. Normal heart. No change.

## 2022-11-23 LAB
ANION GAP SERPL CALC-SCNC: 7 MMOL/L
ANION GAP SERPL CALC-SCNC: 7 MMOL/L
BUN SERPL-SCNC: 32 MG/DL (ref 7–17)
BUN SERPL-SCNC: 34 MG/DL (ref 7–17)
CHLORIDE SERPL-SCNC: 108 MMOL/L (ref 98–107)
CHLORIDE SERPL-SCNC: 109 MMOL/L (ref 98–107)
CO2 SERPL-SCNC: 23 MMOL/L (ref 22–30)
CO2 SERPL-SCNC: 25 MMOL/L (ref 22–30)
GLUCOSE BLD-MCNC: 108 MG/DL (ref 70–110)
GLUCOSE BLD-MCNC: 130 MG/DL (ref 70–110)
GLUCOSE BLD-MCNC: 156 MG/DL (ref 70–110)
GLUCOSE BLD-MCNC: 188 MG/DL (ref 70–110)
GLUCOSE BLD-MCNC: 230 MG/DL (ref 70–110)
GLUCOSE BLD-MCNC: 234 MG/DL (ref 70–110)
GLUCOSE BLD-MCNC: 238 MG/DL (ref 70–110)
GLUCOSE BLD-MCNC: 250 MG/DL (ref 70–110)
GLUCOSE BLD-MCNC: 257 MG/DL (ref 70–110)
GLUCOSE BLD-MCNC: 267 MG/DL (ref 70–110)
GLUCOSE BLD-MCNC: 269 MG/DL (ref 70–110)
GLUCOSE BLD-MCNC: 283 MG/DL (ref 70–110)
GLUCOSE BLD-MCNC: 70 MG/DL (ref 70–110)
GLUCOSE BLD-MCNC: 85 MG/DL (ref 70–110)
GLUCOSE BLD-MCNC: 94 MG/DL (ref 70–110)
GLUCOSE SERPL-MCNC: 138 MG/DL (ref 74–99)
GLUCOSE SERPL-MCNC: 244 MG/DL (ref 74–99)
POTASSIUM SERPL-SCNC: 3.6 MMOL/L (ref 3.5–5.1)
POTASSIUM SERPL-SCNC: 4.1 MMOL/L (ref 3.5–5.1)
SODIUM SERPL-SCNC: 139 MMOL/L (ref 137–145)
SODIUM SERPL-SCNC: 140 MMOL/L (ref 137–145)

## 2022-11-23 RX ADMIN — CLOPIDOGREL BISULFATE SCH MG: 75 TABLET ORAL at 15:02

## 2022-11-23 RX ADMIN — INSULIN ASPART SCH UNIT: 100 INJECTION, SOLUTION INTRAVENOUS; SUBCUTANEOUS at 21:51

## 2022-11-23 RX ADMIN — INSULIN DETEMIR SCH UNIT: 100 INJECTION, SOLUTION SUBCUTANEOUS at 21:50

## 2022-11-23 RX ADMIN — DEXTROSE MONOHYDRATE, SODIUM CHLORIDE, AND POTASSIUM CHLORIDE SCH MLS/HR: 50; 4.5; 1.49 INJECTION, SOLUTION INTRAVENOUS at 09:49

## 2022-11-23 RX ADMIN — INSULIN ASPART SCH UNIT: 100 INJECTION, SOLUTION INTRAVENOUS; SUBCUTANEOUS at 17:34

## 2022-11-23 RX ADMIN — ATORVASTATIN CALCIUM SCH MG: 40 TABLET, FILM COATED ORAL at 15:01

## 2022-11-23 RX ADMIN — DEXTROSE MONOHYDRATE, SODIUM CHLORIDE, AND POTASSIUM CHLORIDE SCH MLS/HR: 50; 4.5; 1.49 INJECTION, SOLUTION INTRAVENOUS at 01:09

## 2022-11-23 RX ADMIN — CEFAZOLIN SCH: 330 INJECTION, POWDER, FOR SOLUTION INTRAMUSCULAR; INTRAVENOUS at 01:21

## 2022-11-23 RX ADMIN — INSULIN ASPART SCH UNIT: 100 INJECTION, SOLUTION INTRAVENOUS; SUBCUTANEOUS at 17:35

## 2022-11-23 RX ADMIN — INSULIN HUMAN SCH: 100 INJECTION, SOLUTION PARENTERAL at 14:56

## 2022-11-23 NOTE — HP
HISTORY AND PHYSICAL



CHIEF COMPLAINTS:

Weakness and high blood sugars, polyuria, polydipsia.



HISTORY OF PRESENT ILLNESS:

This is an 87-year-old woman with a past medical history of multiple medical problems

including diabetes mellitus, also previously seen by Dr. Amos.  The patient apparently

living in [place] near [place] and was being followed by Primary Care and Endocrine.

The patient had automatic sugar monitoring, but according to the daughter fluctuating

widely.  The patient had symptoms of hyperglycemia, polyuria, polydipsia.  The patient

came to [facility].  Sugars elevated up to 309. Ketones were positive.  DKA

protocol was being initiated at this time.  There is no history of any fever, rigors,

or chills.  The patient had right ear pain.  The patient is using Waxsol.



PAST MEDICAL HISTORY:

Reviewed include diabetes mellitus.  The rest of the history and chart is reviewed.



HOME MEDICATIONS:

Reviewed include Cozaar, dose and rest of medications noted.



ALLERGIES:

None.



FAMILY HISTORY:

No history of heart disease or strokes in the family.



SOCIAL HISTORY:

No history of smoking. No history of alcohol.



REVIEW OF SYSTEMS:

A 14-point review is negative except as mentioned earlier.



PHYSICAL EXAMINATION:

VITAL SIGNS:  Pulse is 70, blood pressure 120/60, and respirations 16.

NECK: No JVD.

CARDIOVASCULAR:  S1, S2 muffled.

RESPIRATIONS:  Clear to auscultation.

ABDOMEN:  Soft, nontender.

LEGS:  No edema.

NERVOUS SYSTEM:  No focal deficits.

SKIN:  No ulcer, rash, bleeding.

JOINTS:  No active deforming arthropathy.



LABS:

Reviewed.



ASSESSMENT:

1. Acute diabetic ketoacidosis present on admission with diabetes mellitus type 2,

    uncontrolled.

2. Hypertension.

3. Hyperlipidemia.

4. History of wax, right ear.



RECOMMENDATIONS AND DISCUSSION:

This is an 87-year-old woman who presented with multiple complex medical issues, we

will monitor the patient closely.  I would recommend resuming the home medications.

Transfuse insulin orders. Repeat labs in the morning.  Monitor creatinine closely.

Monitor blood sugars closely. Prognosis guarded. I would recommend the patient to

follow up with Dr. Amos and Endocrinology closely after discharge.





MMODL / IJN: [id number] / Job#: [id number]

## 2022-11-24 LAB
ANION GAP SERPL CALC-SCNC: 8.4 MMOL/L (ref 10–18)
BASOPHILS # BLD AUTO: 0.01 X 10*3/UL (ref 0–0.1)
BASOPHILS NFR BLD AUTO: 0.2 %
BUN SERPL-SCNC: 28.4 MG/DL (ref 9–27)
BUN/CREAT SERPL: 31.56 RATIO (ref 12–20)
CALCIUM SPEC-MCNC: 8.8 MG/DL (ref 8.7–10.3)
CHLORIDE SERPL-SCNC: 105 MMOL/L (ref 96–109)
CO2 SERPL-SCNC: 23.6 MMOL/L (ref 20–27.5)
EOSINOPHIL # BLD AUTO: 0.14 X 10*3/UL (ref 0.04–0.35)
EOSINOPHIL NFR BLD AUTO: 2.3 %
ERYTHROCYTE [DISTWIDTH] IN BLOOD BY AUTOMATED COUNT: 2.94 X 10*6/UL (ref 4.1–5.2)
ERYTHROCYTE [DISTWIDTH] IN BLOOD: 12.5 % (ref 11.5–14.5)
GLUCOSE BLD-MCNC: 160 MG/DL (ref 70–110)
GLUCOSE BLD-MCNC: 171 MG/DL (ref 70–110)
GLUCOSE BLD-MCNC: 193 MG/DL (ref 70–110)
GLUCOSE BLD-MCNC: 99 MG/DL (ref 70–110)
GLUCOSE SERPL-MCNC: 197 MG/DL (ref 70–110)
HCT VFR BLD AUTO: 30.6 % (ref 37.2–46.3)
HGB BLD-MCNC: 10.1 G/DL (ref 12–15)
IMM GRANULOCYTES BLD QL AUTO: 0.2 %
LYMPHOCYTES # SPEC AUTO: 1.05 X 10*3/UL (ref 0.9–5)
LYMPHOCYTES NFR SPEC AUTO: 17.2 %
MCH RBC QN AUTO: 34.4 PG (ref 27–32)
MCHC RBC AUTO-ENTMCNC: 33 G/DL (ref 32–37)
MCV RBC AUTO: 104.1 FL (ref 80–97)
MONOCYTES # BLD AUTO: 0.34 X 10*3/UL (ref 0.2–1)
MONOCYTES NFR BLD AUTO: 5.6 %
NEUTROPHILS # BLD AUTO: 4.57 X 10*3/UL (ref 1.8–7.7)
NEUTROPHILS NFR BLD AUTO: 74.5 %
NRBC BLD AUTO-RTO: 0 /100 WBCS (ref 0–0)
PLATELET # BLD AUTO: 120 X 10*3/UL (ref 140–440)
POTASSIUM SERPL-SCNC: 3.8 MMOL/L (ref 3.5–5.5)
SODIUM SERPL-SCNC: 137 MMOL/L (ref 135–145)
WBC # BLD AUTO: 6.12 X 10*3/UL (ref 4.5–10)

## 2022-11-24 RX ADMIN — INSULIN ASPART SCH UNIT: 100 INJECTION, SOLUTION INTRAVENOUS; SUBCUTANEOUS at 17:25

## 2022-11-24 RX ADMIN — INSULIN ASPART SCH UNIT: 100 INJECTION, SOLUTION INTRAVENOUS; SUBCUTANEOUS at 06:46

## 2022-11-24 RX ADMIN — INSULIN DETEMIR SCH UNIT: 100 INJECTION, SOLUTION SUBCUTANEOUS at 08:32

## 2022-11-24 RX ADMIN — INSULIN ASPART SCH UNIT: 100 INJECTION, SOLUTION INTRAVENOUS; SUBCUTANEOUS at 08:32

## 2022-11-24 RX ADMIN — LEVOTHYROXINE SODIUM SCH MCG: 50 TABLET ORAL at 06:46

## 2022-11-24 RX ADMIN — CLOPIDOGREL BISULFATE SCH MG: 75 TABLET ORAL at 08:32

## 2022-11-24 RX ADMIN — ATORVASTATIN CALCIUM SCH MG: 40 TABLET, FILM COATED ORAL at 08:33

## 2022-11-24 RX ADMIN — INSULIN ASPART SCH: 100 INJECTION, SOLUTION INTRAVENOUS; SUBCUTANEOUS at 12:32

## 2022-11-24 RX ADMIN — INSULIN ASPART SCH UNIT: 100 INJECTION, SOLUTION INTRAVENOUS; SUBCUTANEOUS at 13:09

## 2022-11-24 RX ADMIN — INSULIN ASPART SCH UNIT: 100 INJECTION, SOLUTION INTRAVENOUS; SUBCUTANEOUS at 17:24

## 2022-11-24 RX ADMIN — LOSARTAN POTASSIUM SCH MG: 25 TABLET, FILM COATED ORAL at 08:32

## 2022-11-24 RX ADMIN — HEPARIN SODIUM SCH UNIT: 5000 INJECTION INTRAVENOUS; SUBCUTANEOUS at 21:00

## 2022-11-24 RX ADMIN — CEFAZOLIN SCH: 330 INJECTION, POWDER, FOR SOLUTION INTRAMUSCULAR; INTRAVENOUS at 12:32

## 2022-11-24 RX ADMIN — INSULIN ASPART SCH UNIT: 100 INJECTION, SOLUTION INTRAVENOUS; SUBCUTANEOUS at 21:01

## 2022-11-24 NOTE — P.PN
Subjective





This is a pleasant 87 years old female with multiple medical problems including 

diabetes mellitus on insulin at home presents because of feeling of high sugar, 

she checked her glucoses at home and her device and it was more than 870 she 

decided to come to emergency room.  On admission she had some sore throat and 

dry cough but denies any other symptoms, no chest pain or dyspnea, no headache 

or weakness or numbness or dizziness.  She denies any fever however in the 

emergency room she had a documented high temperature of 100, no more fevers si

nce then.  Her sore throat resolved, no other respiratory symptoms.


Bowel movement is normal, no abdominal pain or vomiting or diarrhea.  No urinary

complaints like urgency or dysuria.  No neurological complaints.


Patient states that she was taken Lantus 12 units and Humalog 8 units with meals

last month her endocrinologist (could not remember the name) lower the dose of 

Levemir and to 8 units.  She was taken her insulin as supposed to be.


However patient looks dehydrated with dry mucous membranes and a dry tongue.


She denies any ear symptoms, no ear pain or discharge, no dental problem.  On a

dmission her creatinine 1.1, improved to 0.9, glucose was elevated.  Hemoglobin 

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  No more 

fever.


Chest x-rays negative.  Patient is on the Plavix at home.


The 90s and she was started Levemir 10 units twice daily, received 2 doses al

ready, were going to lower the dose to 15 units daily from tomorrow and continue

with the follow-up with meals at 7 units.


Also we'll start normal saline at 75 mL/h





Objective





- Vital Signs


Vital signs: 


                                   Vital Signs











Temp  97.6 F   11/24/22 08:00


 


Pulse  73   11/24/22 08:00


 


Resp  17   11/24/22 08:00


 


BP  159/75   11/24/22 08:00


 


Pulse Ox  97   11/24/22 08:00


 


FiO2      








                                 Intake & Output











 11/23/22 11/24/22 11/24/22





 18:59 06:59 18:59


 


Intake Total 10.23  


 


Balance 10.23  


 


Weight  64 kg 


 


Intake:   


 


  Intake, IV Titration 10.23  





  Amount   


 


    Insulin Regular 100 unit 10.23  





    In Sodium Chloride 0.9%   





    100 ml @ 0.1 UNITS/KG/HR   





    6.689 mls/hr IV .Q15H6M   





    [facility] Rx#:[id number]   


 


Other:   


 


  # Voids  1 














- Exam





GENERAL: The patient is alert and oriented x3, not in any acute distress. Well 

developed, well nourished. 


-HEENT: Pupils are round and equally reacting to light. EOMI. No scleral 

icterus. No conjunctival pallor. Normocephalic, atraumatic. No pharyngeal 

erythema. No thyromegaly.  Dry Mucous membranes


CARDIOVASCULAR: S1 and S2 present. No murmurs, rubs, or gallops. 


PULMONARY: Chest is clear to auscultation, no wheezing or crackles. 


ABDOMEN: Soft, nontender, nondistended, normoactive bowel sounds. No palpable 

organomegaly. 


MUSCULOSKELETAL: No joint swelling or deformity. 


EXTREMITIES: No cyanosis, clubbing, or pedal edema. 


NEUROLOGICAL: Gross neurological examination did not reveal any focal deficits. 


SKIN: No rashes. no petechiae.





- Labs


CBC & Chem 7: 


                                 11/24/22 06:26





                                 11/24/22 06:26


Labs: 


                  Abnormal Lab Results - Last 24 Hours (Table)











  11/23/22 11/23/22 11/23/22 Range/Units





  12:09 13:37 16:27 


 


RBC     (4.10-5.20)  X 10*6/uL


 


Hgb     (12.0-15.0)  g/dL


 


Hct     (37.2-46.3)  %


 


MCV     (80.0-97.0)  fL


 


MCH     (27.0-32.0)  pg


 


Plt Count     (140-440)  X 10*3/uL


 


Anion Gap     (10.00-18.00)  mmol/L


 


BUN     (9.0-27.0)  mg/dL


 


Est GFR (CKD-EPI)NonAf     (60.0-200.0)   


 


BUN/Creatinine Ratio     (12.00-20.00)  Ratio


 


Glucose     ()  mg/dL


 


POC Glucose (mg/dL)  267 H  250 H  230 H  ()  mg/dL














  11/23/22 11/24/22 11/24/22 Range/Units





  20:58 05:58 06:26 


 


RBC    2.94 L  (4.10-5.20)  X 10*6/uL


 


Hgb    10.1 L  (12.0-15.0)  g/dL


 


Hct    30.6 L  (37.2-46.3)  %


 


MCV    104.1 H  (80.0-97.0)  fL


 


MCH    34.4 H  (27.0-32.0)  pg


 


Plt Count    120 L  (140-440)  X 10*3/uL


 


Anion Gap     (10.00-18.00)  mmol/L


 


BUN     (9.0-27.0)  mg/dL


 


Est GFR (CKD-EPI)NonAf     (60.0-200.0)   


 


BUN/Creatinine Ratio     (12.00-20.00)  Ratio


 


Glucose     ()  mg/dL


 


POC Glucose (mg/dL)  269 H  193 H   ()  mg/dL














  11/24/22 Range/Units





  06:26 


 


RBC   (4.10-5.20)  X 10*6/uL


 


Hgb   (12.0-15.0)  g/dL


 


Hct   (37.2-46.3)  %


 


MCV   (80.0-97.0)  fL


 


MCH   (27.0-32.0)  pg


 


Plt Count   (140-440)  X 10*3/uL


 


Anion Gap  8.40 L  (10.00-18.00)  mmol/L


 


BUN  28.4 H  (9.0-27.0)  mg/dL


 


Est GFR (CKD-EPI)NonAf  57.5 L  (60.0-200.0)   


 


BUN/Creatinine Ratio  31.56 H  (12.00-20.00)  Ratio


 


Glucose  197 H  ()  mg/dL


 


POC Glucose (mg/dL)   ()  mg/dL








                      Microbiology - Last 24 Hours (Table)











 11/22/22 18:57 Blood Culture - Preliminary





 Blood    No Growth after 24 hours


 


 11/22/22 18:52 Blood Culture - Preliminary





 Blood    No Growth after 24 hours














Assessment and Plan


Assessment: 





Diabetic ketoacidosis, mild.  Resolved


Diabetes mellitus with hyperglycemia present on admission


Mild viral illness with upper respiratory tract infection, none specific.  

Improved.


Dehydration


Anemia


Plan: 





Continue with normal saline 75 mL/h


Change the primary to 15 units with NovoLog 7 units with meals


Follow-up hemoglobin A1c


Monitor hemoglobin


Labs and medication were reviewed..  Continue same treatment.  Continue with 

symptomatic treatment.  Resume home medication.  Monitor labs and vitals.  DVT 

and GI prophylaxis.  Further recommendations as per clinical course of the 

patient


DVT prophylaxis: Subcutaneous heparin


GI Prophylaxis: Pepcid


PT/OT: Pending


Prognosis is guarded

## 2022-11-25 VITALS
TEMPERATURE: 98.2 F | DIASTOLIC BLOOD PRESSURE: 67 MMHG | RESPIRATION RATE: 15 BRPM | SYSTOLIC BLOOD PRESSURE: 153 MMHG | HEART RATE: 76 BPM

## 2022-11-25 LAB
ANION GAP SERPL CALC-SCNC: 10.6 MMOL/L (ref 10–18)
BASOPHILS # BLD AUTO: 0.01 X 10*3/UL (ref 0–0.1)
BASOPHILS NFR BLD AUTO: 0.2 %
BUN SERPL-SCNC: 21.7 MG/DL (ref 9–27)
BUN/CREAT SERPL: 27.13 RATIO (ref 12–20)
CALCIUM SPEC-MCNC: 8.6 MG/DL (ref 8.7–10.3)
CHLORIDE SERPL-SCNC: 98 MMOL/L (ref 96–109)
CO2 SERPL-SCNC: 24.4 MMOL/L (ref 20–27.5)
EOSINOPHIL # BLD AUTO: 0.1 X 10*3/UL (ref 0.04–0.35)
EOSINOPHIL NFR BLD AUTO: 2.1 %
ERYTHROCYTE [DISTWIDTH] IN BLOOD BY AUTOMATED COUNT: 3.08 X 10*6/UL (ref 4.1–5.2)
ERYTHROCYTE [DISTWIDTH] IN BLOOD: 12 % (ref 11.5–14.5)
GLUCOSE BLD-MCNC: 162 MG/DL (ref 70–110)
GLUCOSE BLD-MCNC: 315 MG/DL (ref 70–110)
GLUCOSE SERPL-MCNC: 342 MG/DL (ref 70–110)
HCT VFR BLD AUTO: 31.6 % (ref 37.2–46.3)
HGB BLD-MCNC: 10.5 G/DL (ref 12–15)
IMM GRANULOCYTES BLD QL AUTO: 0 %
LYMPHOCYTES # SPEC AUTO: 0.58 X 10*3/UL (ref 0.9–5)
LYMPHOCYTES NFR SPEC AUTO: 12.3 %
MCH RBC QN AUTO: 34.1 PG (ref 27–32)
MCHC RBC AUTO-ENTMCNC: 33.2 G/DL (ref 32–37)
MCV RBC AUTO: 102.6 FL (ref 80–97)
MONOCYTES # BLD AUTO: 0.29 X 10*3/UL (ref 0.2–1)
MONOCYTES NFR BLD AUTO: 6.1 %
NEUTROPHILS # BLD AUTO: 3.75 X 10*3/UL (ref 1.8–7.7)
NEUTROPHILS NFR BLD AUTO: 79.3 %
NRBC BLD AUTO-RTO: 0 /100 WBCS (ref 0–0)
PLATELET # BLD AUTO: 105 X 10*3/UL (ref 140–440)
POTASSIUM SERPL-SCNC: 4.5 MMOL/L (ref 3.5–5.5)
SODIUM SERPL-SCNC: 133 MMOL/L (ref 135–145)
WBC # BLD AUTO: 4.73 X 10*3/UL (ref 4.5–10)

## 2022-11-25 RX ADMIN — INSULIN ASPART SCH UNIT: 100 INJECTION, SOLUTION INTRAVENOUS; SUBCUTANEOUS at 06:41

## 2022-11-25 RX ADMIN — ATORVASTATIN CALCIUM SCH MG: 40 TABLET, FILM COATED ORAL at 09:28

## 2022-11-25 RX ADMIN — HEPARIN SODIUM SCH UNIT: 5000 INJECTION INTRAVENOUS; SUBCUTANEOUS at 09:28

## 2022-11-25 RX ADMIN — LOSARTAN POTASSIUM SCH MG: 25 TABLET, FILM COATED ORAL at 09:28

## 2022-11-25 RX ADMIN — CLOPIDOGREL BISULFATE SCH MG: 75 TABLET ORAL at 09:28

## 2022-11-25 RX ADMIN — CEFAZOLIN SCH: 330 INJECTION, POWDER, FOR SOLUTION INTRAMUSCULAR; INTRAVENOUS at 03:05

## 2022-11-25 RX ADMIN — INSULIN ASPART SCH UNIT: 100 INJECTION, SOLUTION INTRAVENOUS; SUBCUTANEOUS at 12:10

## 2022-11-25 RX ADMIN — LEVOTHYROXINE SODIUM SCH MCG: 50 TABLET ORAL at 06:42

## 2022-11-25 RX ADMIN — INSULIN ASPART SCH UNIT: 100 INJECTION, SOLUTION INTRAVENOUS; SUBCUTANEOUS at 12:09

## 2022-11-25 NOTE — XR
EXAMINATION TYPE: XR chest 1V

 

DATE OF EXAM: 11/25/2022

 

COMPARISON: 11/22/2022

 

HISTORY: Cough

 

TECHNIQUE: Single frontal view of the chest is obtained.

 

FINDINGS:  There is no focal air space opacity, pleural effusion, or pneumothorax seen.  The cardiac 
silhouette size is within normal limits.   The osseous structures are intact. Atherosclerotic change 
aorta. No overt failure. Heart size normal.

 

IMPRESSION:  No acute process.

## 2022-11-25 NOTE — P.DS
Providers


Date of admission: 


11/22/22 20:05





Attending physician: 


Zoila Bernardo





Primary care physician: 


Zac Amos





Hospital Course: 





Diagnoses:


Diabetic ketoacidosis, mild.  Resolved


Diabetes mellitus with hyperglycemia present on admission


Mild viral illness with upper respiratory tract infection, none specific.  

Improved.


Dehydration


Anemia








Hospital course:


This is a pleasant 87 years old female with multiple medical problems including 

diabetes mellitus on insulin at home presents because of feeling of high sugar, 

she checked her glucoses at home and her device and it was more than 870 she 

decided to come to emergency room.  On admission she had some sore throat and 

dry cough but denies any other symptoms, no chest pain or dyspnea, no headache 

or weakness or numbness or dizziness.  She had low-grade fever, tender degree on

admission however no more fever over the last 3 days.  Patient's symptoms 

completely resolved and patient back to baseline prior to discharge.  On the day

of discharge she is fully awake and oriented, she she denies any chest pain or 

dyspnea, no abdominal pain vomiting diarrhea, no urinary complaints like dysuria

or urgency.  Fever some subsided .  Walking is normal.  Patient thinks she can 

go home today, Glucose controlled.  Patient agrees to increase the dose of long-

acting insulin from 8 units at home up to 15 units which is almost doubled and 

she agrees with Levemir.  Also resected continue with her home dose of NovoLog 8

units with meals and she agrees.  Also patient inserted was closed with a number

for glucose and she told me she has a glucometer.


She had in the morning so repeat chest x-ray was normal.


Patient does not tolerate anymore and she still hurts and diet well


Problems and management plan were discussed with the patient and he verbalized 

understanding and acceptance


Patient was found stable and can be discharged home in guarded prognosis however

he needs follow-up as an outpatient. Patient was instructed to follow up with 

PCP Dr. Amos within one week and patient agrees


Also patient was instructed to follow up with her endocrinologist within 7-10 

days and she agrees, she states that she has the name of the contact information

for her endocrinologist at home and that she is willing to do the follow-up





Physical exam


Gen: patient is a AAOx3, no distress


CVS: S1-S2, RRR, no murmur


Lungs: B/L CTA, no wheezing


Abdomen: soft, no distention, no tenderness, positive bowel sounds


Extremity: no leg edema or induration





Time spent more than 35 minutes





Plan - Discharge Summary


Discharge Rx Participant: Yes


New Discharge Prescriptions: 


New


   RX: Insulin Detemir (Levemir) [Levemir] 15 unit SQ DAILY@0700 #10 ml





Continue


   RX: Levothyroxine Sodium [Synthroid] 50 mcg PO DAILY


   RX: Clopidogrel [Plavix] 75 mg PO DAILY


   RX: Atorvastatin [Lipitor] 40 mg PO DAILY


   RX: Losartan Potassium [Cozaar] 25 mg PO DAILY


   RX: INSULIN LISPRO (HumaLOG) [humaLOG] 8 units SQ AC-TID





Discontinued


   Insulin Glargine [Lantus Vial] 12 unit SQ DAILY


Discharge Medication List





RX: Atorvastatin [Lipitor] 40 mg PO DAILY 11/22/22 [History]


RX: Clopidogrel [Plavix] 75 mg PO DAILY 11/22/22 [History]


RX: INSULIN LISPRO (HumaLOG) [humaLOG] 8 units SQ AC-TID 11/22/22 [History]


RX: Levothyroxine Sodium [Synthroid] 50 mcg PO DAILY 11/22/22 [History]


RX: Losartan Potassium [Cozaar] 25 mg PO DAILY 11/22/22 [History]


RX: Insulin Detemir (Levemir) [Levemir] 15 unit SQ DAILY@0700 #10 ml 11/25/22 

[Rx]








Follow up Appointment(s)/Referral(s): 


Zac Amos MD [Primary Care Provider] - 1-2 days


Patient Instructions/Handouts:  Insulin Detemir (By injection), Diabetic 

Ketoacidosis (DC)


Activity/Diet/Wound Care/Special Instructions: 





heart healthy diet , low carbohydrate diet 1600 kcal per day


activity is restricted till you see your doctor 





we recommend to check your glucose 4 times a day before each meal and at bed 

time , keep the results in a log book and bring it to your doctor upon your 

appointment date


if your glucose is less than 70 or more than 400 then call 911 and come to e

mergency room





We recommend to follow-up with your endocrinologist in 1 week, you have the name

the contact information at home as informed the medical team


Discharge Disposition: HOME WITH HOME HEALTH SERVICES

## 2022-12-05 NOTE — CDI
Documentation Clarification Form



Date: 12/05/2022 05:42:00 AM

From: Carley Finnegan

Phone: 

MRN: [id number]

Admit Date: 11/22/2022 08:05:00 PM

Patient Name: Yumiko Thomas

Visit Number: [id number]

Discharge Date:  11/25/2022 04:34:00 PM





ATTENTION: The Clinical Documentation Specialists (CDI) and [facility] Coding Staff 
appreciate your assistance in clarifying documentation. Please respond to the 
clarification below the line at the bottom and electronically sign. The CDI & 
[facility] Coding staff will review the response and follow-up if needed. Please note: 
Queries are made part of the Legal Health Record. If you have any questions, 
please contact the author of this message via ITS.



Dr. Zoila Bernardo





Your patient has an abnormal lab value:BUN 30 Creatinine 1.19 on 11/22 .   
Patient with dehydration. Please clarify if there is an additional diagnosis 
and/or clinical significance related to this value.



Clinical Indicators:   

Current BUN/CR/GFR   BUN  30,  32, 34, 28.4, 21.7     CREAT   1.19, 1.17,  1.15,
 .9,  .8     GFR   41,  42,  43,  57.5.  66.3



Treatment:  IV fluids,  Per H and P monitor creatinine closely

   

Is there an additional diagnosis and/or clinical significance related to the 
above lab result/information?



[  ] Acute Renal Failure

[  ]  CKD Stage 1 (GFR 90 or greater)

[  ] CKD Stage 2 (GFR 60-89)

[  ] CKD Stage 3 (GFR 30-59)

[ x ] CKD Stage 3a (GFR 45-59)

[  ] CKD Stage 3b (GFR 30-44)

[  ] CKD Stage 4 (GFR 15-29)

[  ] CKD Stage 5 (GFR <15)

[  ] ESRD

[  ] Other, please specify ___________

[  ] Unable to determine





___________________________________________________________________________

MTDD

## 2023-04-17 ENCOUNTER — HOSPITAL ENCOUNTER (OUTPATIENT)
Dept: HOSPITAL 47 - LABWHC1 | Age: 88
Discharge: HOME | End: 2023-04-17
Attending: INTERNAL MEDICINE
Payer: MEDICARE

## 2023-04-17 DIAGNOSIS — E10.65: Primary | ICD-10-CM

## 2023-04-17 LAB
ALBUMIN SERPL-MCNC: 4.1 G/DL (ref 3.8–4.9)
ALBUMIN/GLOB SERPL: 1.84 G/DL (ref 1.6–3.17)
ALP SERPL-CCNC: 83 U/L (ref 41–126)
ALT SERPL-CCNC: 34 U/L (ref 8–44)
ANION GAP SERPL CALC-SCNC: 12.5 MMOL/L (ref 10–18)
AST SERPL-CCNC: 33 U/L (ref 13–35)
BUN SERPL-SCNC: 17.8 MG/DL (ref 9–27)
BUN/CREAT SERPL: 20.55 RATIO (ref 12–20)
CALCIUM SPEC-MCNC: 9.6 MG/DL (ref 8.7–10.3)
CHLORIDE SERPL-SCNC: 102 MMOL/L (ref 96–109)
CHOLEST SERPL-MCNC: 138 MG/DL (ref 0–200)
CO2 SERPL-SCNC: 24.9 MMOL/L (ref 20–27.5)
GLOBULIN SER CALC-MCNC: 2.2 G/DL (ref 1.6–3.3)
GLUCOSE SERPL-MCNC: 151 MG/DL (ref 70–110)
HDLC SERPL-MCNC: 60.4 MG/DL (ref 40–60)
LDLC SERPL CALC-MCNC: 66.9 MG/DL (ref 0–131)
POTASSIUM SERPL-SCNC: 4.5 MMOL/L (ref 3.5–5.5)
PROT SERPL-MCNC: 6.4 G/DL (ref 6.2–8.2)
SODIUM SERPL-SCNC: 140 MMOL/L (ref 135–145)
TRIGL SERPL-MCNC: 53.5 MG/DL (ref 0–149)
VLDLC SERPL CALC-MCNC: 10.7 MG/DL (ref 5–40)

## 2023-04-17 PROCEDURE — 36415 COLL VENOUS BLD VENIPUNCTURE: CPT

## 2023-04-17 PROCEDURE — 83036 HEMOGLOBIN GLYCOSYLATED A1C: CPT

## 2023-04-17 PROCEDURE — 80061 LIPID PANEL: CPT

## 2023-04-17 PROCEDURE — 82043 UR ALBUMIN QUANTITATIVE: CPT

## 2023-04-17 PROCEDURE — 82570 ASSAY OF URINE CREATININE: CPT

## 2023-04-17 PROCEDURE — 84443 ASSAY THYROID STIM HORMONE: CPT

## 2023-04-17 PROCEDURE — 80053 COMPREHEN METABOLIC PANEL: CPT

## 2023-08-12 ENCOUNTER — HOSPITAL ENCOUNTER (EMERGENCY)
Dept: HOSPITAL 47 - EC | Age: 88
Discharge: HOME | End: 2023-08-12
Payer: MEDICARE

## 2023-08-12 VITALS — HEART RATE: 74 BPM | DIASTOLIC BLOOD PRESSURE: 44 MMHG | SYSTOLIC BLOOD PRESSURE: 105 MMHG

## 2023-08-12 VITALS — TEMPERATURE: 98.1 F | RESPIRATION RATE: 18 BRPM

## 2023-08-12 DIAGNOSIS — E11.65: ICD-10-CM

## 2023-08-12 DIAGNOSIS — Z79.899: ICD-10-CM

## 2023-08-12 DIAGNOSIS — I10: ICD-10-CM

## 2023-08-12 DIAGNOSIS — Z79.4: ICD-10-CM

## 2023-08-12 DIAGNOSIS — E78.5: ICD-10-CM

## 2023-08-12 DIAGNOSIS — E11.649: Primary | ICD-10-CM

## 2023-08-12 LAB
ALBUMIN SERPL-MCNC: 3.5 G/DL (ref 3.5–5)
ALP SERPL-CCNC: 111 U/L (ref 38–126)
ALT SERPL-CCNC: 27 U/L (ref 4–34)
ANION GAP SERPL CALC-SCNC: 7 MMOL/L
AST SERPL-CCNC: 35 U/L (ref 14–36)
BASOPHILS # BLD AUTO: 0 K/UL (ref 0–0.2)
BASOPHILS NFR BLD AUTO: 0 %
BUN SERPL-SCNC: 27 MG/DL (ref 7–17)
CALCIUM SPEC-MCNC: 9 MG/DL (ref 8.4–10.2)
CHLORIDE SERPL-SCNC: 100 MMOL/L (ref 98–107)
CO2 SERPL-SCNC: 25 MMOL/L (ref 22–30)
EOSINOPHIL # BLD AUTO: 0.1 K/UL (ref 0–0.7)
EOSINOPHIL NFR BLD AUTO: 2 %
ERYTHROCYTE [DISTWIDTH] IN BLOOD BY AUTOMATED COUNT: 3.12 M/UL (ref 3.8–5.4)
ERYTHROCYTE [DISTWIDTH] IN BLOOD: 12.8 % (ref 11.5–15.5)
GLUCOSE BLD-MCNC: 115 MG/DL (ref 70–110)
GLUCOSE BLD-MCNC: 170 MG/DL (ref 70–110)
GLUCOSE SERPL-MCNC: 105 MG/DL (ref 74–99)
HCT VFR BLD AUTO: 33.5 % (ref 34–46)
HGB BLD-MCNC: 11.3 GM/DL (ref 11.4–16)
LYMPHOCYTES # SPEC AUTO: 1 K/UL (ref 1–4.8)
LYMPHOCYTES NFR SPEC AUTO: 19 %
MCH RBC QN AUTO: 36.2 PG (ref 25–35)
MCHC RBC AUTO-ENTMCNC: 33.7 G/DL (ref 31–37)
MCV RBC AUTO: 107.3 FL (ref 80–100)
MONOCYTES # BLD AUTO: 0.3 K/UL (ref 0–1)
MONOCYTES NFR BLD AUTO: 5 %
NEUTROPHILS # BLD AUTO: 3.9 K/UL (ref 1.3–7.7)
NEUTROPHILS NFR BLD AUTO: 72 %
PLATELET # BLD AUTO: 155 K/UL (ref 150–450)
POTASSIUM SERPL-SCNC: 4.1 MMOL/L (ref 3.5–5.1)
PROT SERPL-MCNC: 6.1 G/DL (ref 6.3–8.2)
SODIUM SERPL-SCNC: 132 MMOL/L (ref 137–145)
WBC # BLD AUTO: 5.5 K/UL (ref 3.8–10.6)

## 2023-08-12 PROCEDURE — 99284 EMERGENCY DEPT VISIT MOD MDM: CPT

## 2023-08-12 PROCEDURE — 84484 ASSAY OF TROPONIN QUANT: CPT

## 2023-08-12 PROCEDURE — 80053 COMPREHEN METABOLIC PANEL: CPT

## 2023-08-12 PROCEDURE — 85025 COMPLETE CBC W/AUTO DIFF WBC: CPT

## 2023-08-12 PROCEDURE — 36415 COLL VENOUS BLD VENIPUNCTURE: CPT

## 2023-08-12 NOTE — ED
Recheck HPI





- General


Chief Complaint: Recheck/Abnormal Lab/Rx


Stated Complaint: Hyperglycemia


Time Seen by Provider: 23 04:39


Source: EMS


Mode of arrival: EMS





- History of Present Illness


Initial Comments: 





This patient is an 88-year-old woman who arrives to have evaluation after she 

believes she had actually taken the wrong insulin.  The patient states that 

since taking the long-acting and short-acting insulin she may have administered 

the short-acting twice.  EMS was phoned as the patient had not been feeling 

quite right, found blood sugar to be 50 and administered dextrose.  The patient 

now feeling better, currently denies chest pain dyspnea, nausea vomiting, filiberto

phoresis.  She states she feels well 


MD Complaint: other


Onset/Timin


-: hour(s)


Returns Today for: other


Symptoms Since Prior Visit: no new symptoms


Associated Symptoms: none





- Related Data


                                Home Medications











 Medication  Instructions  Recorded  Confirmed


 


Atorvastatin [Lipitor] 40 mg PO DAILY 22


 


Clopidogrel [Plavix] 75 mg PO DAILY 22


 


INSULIN LISPRO (HumaLOG) [humaLOG] 8 units SQ AC-TID 22


 


Levothyroxine Sodium [Synthroid] 50 mcg PO DAILY 22


 


Losartan Potassium [Cozaar] 25 mg PO DAILY 22








                                  Previous Rx's











 Medication  Instructions  Recorded


 


Insulin Detemir (Levemir) [Levemir] 15 unit SQ DAILY@0700 #10 ml 22











                                    Allergies











Allergy/AdvReac Type Severity Reaction Status Date / Time


 


No Known Allergies Allergy   Verified 23 04:14














Review of Systems


ROS Statement: 


Those systems with pertinent positive or pertinent negative responses have been 

documented in the HPI.





ROS Other: All systems not noted in ROS Statement are negative.


Constitutional: Denies: fever


Eyes: Denies: vision change


Respiratory: Denies: cough, dyspnea


Cardiovascular: Denies: chest pain, palpitations, syncope


Gastrointestinal: Denies: abdominal pain, vomiting, diarrhea


Genitourinary: Denies: dysuria


Musculoskeletal: Denies: back pain


Neurological: Denies: headache, weakness, confusion





Past Medical History


Past Medical History: Diabetes Mellitus, Hyperlipidemia, Hypertension


History of Any Multi-Drug Resistant Organisms: None Reported


Past Surgical History: Heart Catheterization With Stent, Tonsillectomy


Date of Last Stent Placement:: May, 2022


Past Psychological History: No Psychological Hx Reported


Smoking Status: Never smoker


Past Alcohol Use History: None Reported


Past Drug Use History: None Reported





General Exam


General appearance: alert, in no apparent distress


Head exam: Present: atraumatic, normocephalic


Eye exam: Present: normal appearance.  Absent: scleral icterus, conjunctival 

injection


ENT exam: Present: normal oropharynx


Respiratory exam: Present: normal lung sounds bilaterally.  Absent: respiratory 

distress, wheezes, rales, rhonchi, stridor


Cardiovascular Exam: Present: regular rate, normal rhythm, normal heart sounds. 

 Absent: systolic murmur, diastolic murmur, rubs, gallop


GI/Abdominal exam: Present: soft.  Absent: distended, tenderness, guarding, 

rebound, rigid, mass


Extremities exam: Present: normal inspection, normal capillary refill.  Absent: 

pedal edema, calf tenderness


Back exam: Present: normal inspection.  Absent: CVA tenderness (R), CVA 

tenderness (L)


Neurological exam: Present: alert


Skin exam: Present: warm, dry, intact, normal color.  Absent: rash





Course


                                   Vital Signs











  23





  04:10 04:30 05:00


 


Temperature 98.1 F  


 


Pulse Rate 78 76 71


 


Respiratory 18 18 18





Rate   


 


Blood Pressure 186/73 166/65 156/61


 


O2 Sat by Pulse 99 98 98





Oximetry   














  23





  05:30 06:00


 


Temperature  


 


Pulse Rate 69 74


 


Respiratory 18 18





Rate  


 


Blood Pressure 121/49 105/44


 


O2 Sat by Pulse 98 98





Oximetry  














Medical Decision Making





- Medical Decision Making





Was pt. sent in by a medical professional or institution (, PA, NP, urgent 

care, hospital, or nursing home...) When possible be specific


@  -[No]


Did you speak to anyone other than the patient for history (EMS, parent, family,

 police, friend...)? What history was obtained from this source 


@  -[No]


Did you review nursing and triage notes (agree or disagree)?  Why? 


@  -[I reviewed and agree with nursing and triage notes]


Were old charts reviewed (outside hosp., previous admission, EMS record, old 

EKG, old radiological studies, urgent care reports/EKG's, nursing home records)?

Report findings 


@  -[No old charts were reviewed]


Differential Diagnosis (chest pain, altered mental status, abdominal pain women,

abdominal pain men, vaginal bleeding, weakness, fever, dyspnea, syncope, 

headache, dizziness, GI bleed, back pain, seizure, CVA, palpatations, mental 

health, musculoskeletal)? 


@  -[Differential Weakness:


Hypoglycemia, shock, sepsis, hyponatremia, anemia, infection, MI, ETOH, adverse 

medicine reaction, overdose, stroke, this is not meant to be an all-inclusive 

list.


EKG interpreted by me (3pts min.).


@  -[


X-rays interpreted by me (1pt min.).


@  -[None done]


CT interpreted by me (1pt min.).


@  -[None done]


U/S interpreted by me (1pt. min.).


@  -[None done]


What testing was considered but not performed or refused? (CT, X-rays, U/S, 

labs)? Why?


@  -[None]


What meds were considered but not given or refused? Why?


@  -[None]


Did you discuss the management of the patient with other professionals 

(professionals i.e. , PA, NP, lab, RT, psych nurse, , , 

teacher, , )? Give summary


@  -[No]


Was smoking cessation discussed for >3mins.?


@  -[No]


Was critical care preformed (if so, how long)?


@  -[No]


Were there social determinants of health that impacted care today? How? 

(Homelessness, low income, unemployed, alcoholism, drug addiction, 

transportation, low edu. Level, literacy, decrease access to med. care, halfway, 

rehab)?


@  -[No]


Was there de-escalation of care discussed even if they declined (Discuss DNR or 

withdrawal of care, Hospice)? DNR status


@  -[No]


What co-morbidities impacted this encounter? (DM, HTN, Smoking, COPD, CAD, 

Cancer, CVA, ARF, Chemo, Hep., AIDS, mental health diagnosis, sleep apnea, 

morbid obesity)?


@  -[None]


Was patient admitted / discharged? Hospital course, mention meds given and 

route, prescriptions, significant lab abnormalities, going to OR and other 

pertinent info.


@  -[Patient is an 88-year-old woman here after she had developed hypoglycemia 

after suspected to have taken short acting insulin twice.  Her blood sugar has 

been stable in emergency departments, she is feeling well, she tolerates oral 

intake and would like to go home. 


Undiagnosed new problem with uncertain prognosis?


@  -[No]


Drug Therapy requiring intensive monitoring for toxicity (Heparin, Nitro, 

Insulin, Cardizem)?


@  -[No]


Were any procedures done?


@  -[No]


Diagnosis/symptom?


@  -[Acute hypoglycemia by history


Acute, or Chronic, or Acute on Chronic?


@  -[Acute


Uncomplicated (without systemic symptoms) or Complicated (systemic symptoms)?


@  -[Uncomplicated


Side effects of treatment?


@  -[No]


Exacerbation, Progression, or Severe Exacerbation?


@  -[No]


Poses a threat to life or bodily function? How? (Chest pain, USA, MI, pneumonia,

 PE, COPD, DKA, ARF, appy, cholecystitis, CVA, Diverticulitis, Homicidal, 

Suicidal, threat to staff... and all critical care pts)


@  -[No]





- Lab Data


Result diagrams: 


                                 23 04:13





                                 23 04:13


                                   Lab Results











  23 Range/Units





  04:10 04:13 04:13 


 


WBC   5.5   (3.8-10.6)  k/uL


 


RBC   3.12 L   (3.80-5.40)  m/uL


 


Hgb   11.3 L   (11.4-16.0)  gm/dL


 


Hct   33.5 L   (34.0-46.0)  %


 


MCV   107.3 H   (80.0-100.0)  fL


 


MCH   36.2 H   (25.0-35.0)  pg


 


MCHC   33.7   (31.0-37.0)  g/dL


 


RDW   12.8   (11.5-15.5)  %


 


Plt Count   155   (150-450)  k/uL


 


MPV   8.0   


 


Neutrophils %   72   %


 


Lymphocytes %   19   %


 


Monocytes %   5   %


 


Eosinophils %   2   %


 


Basophils %   0   %


 


Neutrophils #   3.9   (1.3-7.7)  k/uL


 


Lymphocytes #   1.0   (1.0-4.8)  k/uL


 


Monocytes #   0.3   (0-1.0)  k/uL


 


Eosinophils #   0.1   (0-0.7)  k/uL


 


Basophils #   0.0   (0-0.2)  k/uL


 


Macrocytosis   Moderate   


 


Sodium    132 L  (137-145)  mmol/L


 


Potassium    4.1  (3.5-5.1)  mmol/L


 


Chloride    100  ()  mmol/L


 


Carbon Dioxide    25  (22-30)  mmol/L


 


Anion Gap    7  mmol/L


 


BUN    27 H  (7-17)  mg/dL


 


Creatinine    1.08 H  (0.52-1.04)  mg/dL


 


Est GFR (CKD-EPI)AfAm    53  (>60 ml/min/1.73 sqM)  


 


Est GFR (CKD-EPI)NonAf    46  (>60 ml/min/1.73 sqM)  


 


Glucose    105 H  (74-99)  mg/dL


 


POC Glucose (mg/dL)  115 H    ()  mg/dL


 


POC Glu Operater ID  Mau Bravo    


 


Calcium    9.0  (8.4-10.2)  mg/dL


 


Total Bilirubin    0.4  (0.2-1.3)  mg/dL


 


AST    35  (14-36)  U/L


 


ALT    27  (4-34)  U/L


 


Alkaline Phosphatase    111  ()  U/L


 


Troponin I     (0.000-0.034)  ng/mL


 


Total Protein    6.1 L  (6.3-8.2)  g/dL


 


Albumin    3.5  (3.5-5.0)  g/dL














  23 Range/Units





  04:13 06:06 


 


WBC    (3.8-10.6)  k/uL


 


RBC    (3.80-5.40)  m/uL


 


Hgb    (11.4-16.0)  gm/dL


 


Hct    (34.0-46.0)  %


 


MCV    (80.0-100.0)  fL


 


MCH    (25.0-35.0)  pg


 


MCHC    (31.0-37.0)  g/dL


 


RDW    (11.5-15.5)  %


 


Plt Count    (150-450)  k/uL


 


MPV    


 


Neutrophils %    %


 


Lymphocytes %    %


 


Monocytes %    %


 


Eosinophils %    %


 


Basophils %    %


 


Neutrophils #    (1.3-7.7)  k/uL


 


Lymphocytes #    (1.0-4.8)  k/uL


 


Monocytes #    (0-1.0)  k/uL


 


Eosinophils #    (0-0.7)  k/uL


 


Basophils #    (0-0.2)  k/uL


 


Macrocytosis    


 


Sodium    (137-145)  mmol/L


 


Potassium    (3.5-5.1)  mmol/L


 


Chloride    ()  mmol/L


 


Carbon Dioxide    (22-30)  mmol/L


 


Anion Gap    mmol/L


 


BUN    (7-17)  mg/dL


 


Creatinine    (0.52-1.04)  mg/dL


 


Est GFR (CKD-EPI)AfAm    (>60 ml/min/1.73 sqM)  


 


Est GFR (CKD-EPI)NonAf    (>60 ml/min/1.73 sqM)  


 


Glucose    (74-99)  mg/dL


 


POC Glucose (mg/dL)   170 H  ()  mg/dL


 


POC Glu Operater ID   Mau Bravo  


 


Calcium    (8.4-10.2)  mg/dL


 


Total Bilirubin    (0.2-1.3)  mg/dL


 


AST    (14-36)  U/L


 


ALT    (4-34)  U/L


 


Alkaline Phosphatase    ()  U/L


 


Troponin I  <0.012   (0.000-0.034)  ng/mL


 


Total Protein    (6.3-8.2)  g/dL


 


Albumin    (3.5-5.0)  g/dL














Disposition


Clinical Impression: 


 Hypoglycemia





Disposition: HOME SELF-CARE


Condition: Good


Instructions (If sedation given, give patient instructions):  Hypoglycemia in a 

Person with Diabetes (DC)


Is patient prescribed a controlled substance at d/c from ED?: No


Referrals: 


Zac Amos MD [Primary Care Provider] - 1-2 days

## 2025-02-17 ENCOUNTER — HOSPITAL ENCOUNTER (OUTPATIENT)
Dept: HOSPITAL 47 - EC | Age: OVER 89
Setting detail: OBSERVATION
LOS: 5 days | Discharge: NURSING, REHAB OR LONG-TERM CARE | End: 2025-02-22
Attending: HOSPITALIST | Admitting: HOSPITALIST
Payer: MEDICARE

## 2025-02-17 DIAGNOSIS — D64.9: ICD-10-CM

## 2025-02-17 DIAGNOSIS — E03.9: ICD-10-CM

## 2025-02-17 DIAGNOSIS — Z79.4: ICD-10-CM

## 2025-02-17 DIAGNOSIS — Z91.81: ICD-10-CM

## 2025-02-17 DIAGNOSIS — Z79.890: ICD-10-CM

## 2025-02-17 DIAGNOSIS — R47.81: ICD-10-CM

## 2025-02-17 DIAGNOSIS — I10: ICD-10-CM

## 2025-02-17 DIAGNOSIS — Z79.02: ICD-10-CM

## 2025-02-17 DIAGNOSIS — I35.0: ICD-10-CM

## 2025-02-17 DIAGNOSIS — F03.C0: ICD-10-CM

## 2025-02-17 DIAGNOSIS — I65.23: ICD-10-CM

## 2025-02-17 DIAGNOSIS — Z79.899: ICD-10-CM

## 2025-02-17 DIAGNOSIS — I25.10: ICD-10-CM

## 2025-02-17 DIAGNOSIS — Z95.5: ICD-10-CM

## 2025-02-17 DIAGNOSIS — E11.65: ICD-10-CM

## 2025-02-17 DIAGNOSIS — E11.649: ICD-10-CM

## 2025-02-17 DIAGNOSIS — K21.9: ICD-10-CM

## 2025-02-17 DIAGNOSIS — R53.1: Primary | ICD-10-CM

## 2025-02-17 DIAGNOSIS — E78.5: ICD-10-CM

## 2025-02-17 LAB
ANION GAP SERPL CALC-SCNC: 10 MMOL/L
APTT BLD: 22.2 SEC (ref 22–30)
BASOPHILS # BLD AUTO: 0 K/UL (ref 0–0.2)
BASOPHILS NFR BLD AUTO: 0 %
BUN SERPL-SCNC: 25 MG/DL (ref 7–17)
CALCIUM SPEC-MCNC: 8.6 MG/DL (ref 8.4–10.2)
CHLORIDE SERPL-SCNC: 102 MMOL/L (ref 98–107)
CO2 SERPL-SCNC: 22 MMOL/L (ref 22–30)
EOSINOPHIL # BLD AUTO: 0 K/UL (ref 0–0.7)
EOSINOPHIL NFR BLD AUTO: 1 %
ERYTHROCYTE [DISTWIDTH] IN BLOOD BY AUTOMATED COUNT: 3.02 M/UL (ref 3.8–5.4)
ERYTHROCYTE [DISTWIDTH] IN BLOOD: 12.4 % (ref 11.5–15.5)
GLUCOSE BLD-MCNC: 110 MG/DL (ref 70–110)
GLUCOSE BLD-MCNC: 186 MG/DL (ref 70–110)
GLUCOSE BLD-MCNC: 70 MG/DL (ref 70–110)
GLUCOSE SERPL-MCNC: 62 MG/DL (ref 74–99)
HCT VFR BLD AUTO: 32.8 % (ref 34–46)
HGB BLD-MCNC: 10.9 GM/DL (ref 11.4–16)
INR PPP: 1 (ref ?–1.2)
LYMPHOCYTES # SPEC AUTO: 0.6 K/UL (ref 1–4.8)
LYMPHOCYTES NFR SPEC AUTO: 10 %
MCH RBC QN AUTO: 35.9 PG (ref 25–35)
MCHC RBC AUTO-ENTMCNC: 33.1 G/DL (ref 31–37)
MCV RBC AUTO: 108.5 FL (ref 80–100)
MONOCYTES # BLD AUTO: 0.3 K/UL (ref 0–1)
MONOCYTES NFR BLD AUTO: 4 %
NEUTROPHILS # BLD AUTO: 5.5 K/UL (ref 1.3–7.7)
NEUTROPHILS NFR BLD AUTO: 84 %
PLATELET # BLD AUTO: 169 K/UL (ref 150–450)
POTASSIUM SERPL-SCNC: 4.3 MMOL/L (ref 3.5–5.1)
PT BLD: 11.2 SEC (ref 10–12.5)
SODIUM SERPL-SCNC: 134 MMOL/L (ref 137–145)
WBC # BLD AUTO: 6.5 K/UL (ref 3.8–10.6)

## 2025-02-17 PROCEDURE — 96376 TX/PRO/DX INJ SAME DRUG ADON: CPT

## 2025-02-17 PROCEDURE — 70551 MRI BRAIN STEM W/O DYE: CPT

## 2025-02-17 PROCEDURE — 95816 EEG AWAKE AND DROWSY: CPT

## 2025-02-17 PROCEDURE — 36415 COLL VENOUS BLD VENIPUNCTURE: CPT

## 2025-02-17 PROCEDURE — 84443 ASSAY THYROID STIM HORMONE: CPT

## 2025-02-17 PROCEDURE — 81001 URINALYSIS AUTO W/SCOPE: CPT

## 2025-02-17 PROCEDURE — 85025 COMPLETE CBC W/AUTO DIFF WBC: CPT

## 2025-02-17 PROCEDURE — 97530 THERAPEUTIC ACTIVITIES: CPT

## 2025-02-17 PROCEDURE — 96372 THER/PROPH/DIAG INJ SC/IM: CPT

## 2025-02-17 PROCEDURE — 99285 EMERGENCY DEPT VISIT HI MDM: CPT

## 2025-02-17 PROCEDURE — 80048 BASIC METABOLIC PNL TOTAL CA: CPT

## 2025-02-17 PROCEDURE — 97166 OT EVAL MOD COMPLEX 45 MIN: CPT

## 2025-02-17 PROCEDURE — 70450 CT HEAD/BRAIN W/O DYE: CPT

## 2025-02-17 PROCEDURE — 97161 PT EVAL LOW COMPLEX 20 MIN: CPT

## 2025-02-17 PROCEDURE — 97535 SELF CARE MNGMENT TRAINING: CPT

## 2025-02-17 PROCEDURE — 96374 THER/PROPH/DIAG INJ IV PUSH: CPT

## 2025-02-17 PROCEDURE — 92610 EVALUATE SWALLOWING FUNCTION: CPT

## 2025-02-17 PROCEDURE — 85730 THROMBOPLASTIN TIME PARTIAL: CPT

## 2025-02-17 PROCEDURE — 85610 PROTHROMBIN TIME: CPT

## 2025-02-17 PROCEDURE — 83036 HEMOGLOBIN GLYCOSYLATED A1C: CPT

## 2025-02-17 PROCEDURE — 80061 LIPID PANEL: CPT

## 2025-02-17 PROCEDURE — 93880 EXTRACRANIAL BILAT STUDY: CPT

## 2025-02-17 PROCEDURE — 93306 TTE W/DOPPLER COMPLETE: CPT

## 2025-02-17 RX ADMIN — INSULIN DETEMIR SCH UNIT: 100 INJECTION, SOLUTION SUBCUTANEOUS at 22:23

## 2025-02-17 RX ADMIN — ASPIRIN 81 MG CHEWABLE TABLET STA MG: 81 TABLET CHEWABLE at 18:15

## 2025-02-17 NOTE — P.HPIM
History of Present Illness





This is a pleasant 89 years old female who presents because of episode of 

slurred speech, information were obtained with the help of the daughter at 

bedside


As per daughter her slurred speech resolved by the time EMS came to pick her up 

to the hospital.


Currently she denies any slurred speech blurred vision, weakness or numbness or 

tingling in extremities, no headache or dizziness


Also she slid out of her bed yesterday when she was trying to get up to go to 

the restroom


She denies specific urinary symptoms, no vomiting or diarrhea or abdominal pain.

 No chest pain or dyspnea


Patient also she has a history of forgetfulness, as per daughter patient will 

stand against the pictures talking to them and trying to feed them and would not

go out unless she taken with her


She fell at home last December and she has memory problem, she has been falling 

more over the last 4 once and the daughter is concerned about her safety and she

wants to talk to the 





Vitals are stable blood glucose was low on admission at 62


She has low hemoglobin at 10.9, rest of labs including CBC, BMP LFT and INR were

unremarkable


CT of the brain negative for acute process


Carotid duplex showing 50% stenosis at the bifurcation of both carotid arteries 

more on the right side











Review of Systems





Review of systems


CONSTITUTIONAL: No fever, no malaise, no fatigue. 


HEENT: No recent visual problems or hearing problems. Denied any sore throat. 


CARDIOVASCULAR: No  orthopnea, PND, no palpitations, no syncope. 


PULMONARY: No shortness of breath, no cough, no hemoptysis. 


GASTROINTESTINAL: No diarrhea, no nausea, no vomiting, no abdominal pain. 

Normoactive bowel sounds. 


NEUROLOGICAL: No headaches, no weakness, no numbness. 


HEMATOLOGICAL: Denies any bleeding or petechiae. 


GENITOURINARY: Denies any burning micturition, frequency, or urgency. 


MUSCULOSKELETAL/RHEUMATOLOGICAL: Denies any joint pain, swelling, or any muscle 

pain. 


ENDOCRINE: Denies any polyuria or polydipsia.








Past Medical History


Past Medical History: Diabetes Mellitus, Hyperlipidemia, Hypertension


History of Any Multi-Drug Resistant Organisms: None Reported


Past Surgical History: Heart Catheterization With Stent, Tonsillectomy


Date of Last Stent Placement:: May, 2022


Past Psychological History: No Psychological Hx Reported


Smoking Status: Never smoker


Past Alcohol Use History: None Reported


Past Drug Use History: None Reported





Medications and Allergies


                                Home Medications











 Medication  Instructions  Recorded  Confirmed  Type


 


Atorvastatin [Lipitor] 40 mg PO DAILY 11/22/22 02/17/25 History


 


Clopidogrel [Plavix] 75 mg PO DAILY 11/22/22 02/17/25 History


 


INSULIN LISPRO (HumaLOG) [humaLOG] 5 units SQ AC-TID 11/22/22 02/17/25 History


 


Levothyroxine Sodium [Synthroid] 50 mcg PO DAILY 11/22/22 02/17/25 History


 


Glucagon [Gvoke Pfs 1-Pack Syringe] 1 mg SQ AS DIRECTED PRN 02/17/25 02/17/25 

History


 


Insulin Glargine (Lantus) [Lantus 12 unit SQ HS 02/17/25 02/17/25 History





Vial]    


 


Losartan Potassium [Cozaar] 100 mg PO DAILY 02/17/25 02/17/25 History








                                    Allergies











Allergy/AdvReac Type Severity Reaction Status Date / Time


 


No Known Allergies Allergy   Verified 02/17/25 12:16














Physical Exam


Vitals: 


                                   Vital Signs











  Temp Pulse Resp BP Pulse Ox


 


 02/17/25 16:15   69  18  170/73  95


 


 02/17/25 10:55  98.4 F  72  18  159/62  98








                                Intake and Output











 02/17/25 02/17/25 02/17/25





 06:59 14:59 22:59


 


Other:   


 


  Weight  63.503 kg 














GENERAL: The patient is alert and oriented x3, not in any acute distress. Well 

developed, well nourished. 


HEENT: Pupils are round and equally reacting to light. EOMI. No scleral icterus.

 No conjunctival pallor. Normocephalic, atraumatic. No pharyngeal erythema. No 

thyromegaly. 


CARDIOVASCULAR: S1 and S2 present. No murmurs, rubs, or gallops. 


PULMONARY: Chest is clear to auscultation, no wheezing , no crackles. 


ABDOMEN: Soft, nontender, nondistended, normoactive bowel sounds. No palpable 

organomegaly. 


MUSCULOSKELETAL: No joint swelling or deformity. 


EXTREMITIES: No cyanosis, clubbing, or pedal edema. 


NEUROLOGICAL: Gross neurological examination did not reveal any focal deficits. 


SKIN: No rashes. no petechiae.








Results


CBC & Chem 7: 


                                 02/17/25 12:52





                                 02/17/25 12:52


Labs: 


                  Abnormal Lab Results - Last 24 Hours (Table)











  02/17/25 02/17/25 Range/Units





  12:52 12:52 


 


RBC  3.02 L   (3.80-5.40)  m/uL


 


Hgb  10.9 L   (11.4-16.0)  gm/dL


 


Hct  32.8 L   (34.0-46.0)  %


 


MCV  108.5 H   (80.0-100.0)  fL


 


MCH  35.9 H   (25.0-35.0)  pg


 


Lymphocytes #  0.6 L   (1.0-4.8)  k/uL


 


Sodium   134 L  (137-145)  mmol/L


 


BUN   25 H  (7-17)  mg/dL


 


Glucose   62 L  (74-99)  mg/dL














Assessment and Plan


Assessment: 





Episode of slurred speech suspicious for TIA


Diabetes mellitus with hyperglycemia on admission


History of fall last December


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


Chronic anemia


Hypothyroidism on treatment








Plan: 








Continue with home dose of Plavix


Lower dose of Levemir 12 units down to 8 units and NovoLog-is down to 3 units 

and check hemoglobin A1c


Checked TSH


Check B12 and folate


Echocardiogram is pending


Check urine analysis and bladder scan


Neurology consult


 consult


GI prophylaxis: Pepcid


DVT prophylaxis heparin


Prognosis guarded

## 2025-02-17 NOTE — CT
EXAMINATION TYPE: CT brain wo con

 

DATE OF EXAM: 2/17/2025 1:29 PM

 

COMPARISON: 7/23/2020.

 

CLINICAL INDICATION: Female, 89 years old with history of slurred speech, slurred speech

 

TECHNIQUE: 

Brain: Axial CT images of the brain were obtained with coronal and sagittal reformats created and rev
iewed.

Contrast used: None.

Oral contrast used: None.

CT DLP: 1154.4 mGycm, Automated exposure control for dose reduction was used.

 

FINDINGS:

 

Brain:

Extra-axial spaces: No abnormal extra-axial fluid collections.

Ventricular system: Dilatation in proportion to cerebral atrophy.

Cerebral parenchyma: Cerebral atrophy. No acute intraparenchymal hemorrhage or mass effect.  The gray
-white junction is well differentiated. Scattered hypoattenuating areas are seen within the white mat
ter.  

Cerebellum: Unremarkable.

Mass effect: No evidence of midline shift.

Intracranial vasculature: unremarkable

Soft tissues: Normal.

Calvarium/osseous structures: No depressed skull fracture.

Paranasal sinuses and mastoid air cells: Mild scattered paranasal sinus disease.

Visualized orbits: Orbital contents are intact.

 

IMPRESSION:

1. No acute intracranial process.

2. Nonspecific white matter changes, likely secondary to chronic small vessel ischemic disease.

 

 

 

[facility], Workstation: [id number], 2/17/2025 1:42 PM

## 2025-02-17 NOTE — US
EXAMINATION TYPE: US carotid duplex BILAT

 

DATE OF EXAM: 2/17/2025

 

COMPARISON: NONE

 

CLINICAL INDICATION: Female, 89 years old with history of tia; TIA

Additional History: ....

 

TECHNIQUE: Grayscale, color Doppler and spectral Doppler evaluation of the bilateral carotid systems 
and vertebral arteries. Indirect Doppler criteria was utilized. 

 

FINDINGS:

 

EXAM MEASUREMENTS: 

 

RIGHT:  Peak Systolic Velocity (PSV) cm/sec

----- Right CCA:  72.1  

----- Right ICA:  124.0     

----- Right ECA:  141   

ICA/CCA ratio:  1.72    

 

RIGHT:  End Diastole cm/sec

----- Right CCA:  9.74   

----- Right ICA:  30.0      

----- Right ECA:  0.0     

 

LEFT:  Peak Systolic Velocity (PSV) cm/sec

----- Left CCA:  73.3  

----- Left ICA:  126.0   

----- Left ECA:  105  

ICA/CCA ratio:  1.72  

 

LEFT:  End Diastole cm/sec

----- Left CCA:  18.3  

----- Left ICA:  30.8   

----- Left ECA:  0.0 

 

VERTEBRALS (direction of flow):

Right Vertebral: Antegrade

Left Vertebral: Antegrade

 

Rhythm:  Normal

 

SONOGRAPHER NOTES: small amount of plaque seen throughout bilateral CCAs/ bulbs. Slightly elevated ve
locity seen within the left distal ICA

 

Color Doppler imaging shows patency with blood flow throughout the carotid artery. Spectral waveforms
 are within normal limits. 

 

IMPRESSION:  

 

1. Atheromatous plaquing present bilaterally.

2. There is approximately 50% narrowing at the bilateral carotid bifurcations slightly greater on the
 left than the right.

 

Criteria for Assigning % of Stenosis / Diameter reduction

(Estimation based on the indirect measurements of the internal carotid artery velocities (ICA PSV).

1.  Normal (no stenosis)=ICA PSV < 125 cm/s: ratio < 2.0: ICA EDV<40 cm/s.

2. Less than 50% stenosis=ICA PSV < 125 cm/s: ratio < 2.0: ICA EDV<40 cm/s.

3.  50 to 69% stenosis=ICA PSV of 125 to 230 cm/s: ration 2.0 ? 4.0: ICA EDV  cm/s.

4.  Greater than 70% stenosis to near occlusion= ICA PSV > 230 cm/s: ratio > 4.0: ICA EDV > 100 cm/s.
 

5.  Near occlusion= ICA PSV velocities may be low or undetectable: variable ratio and ICA EDV.

6.  Total occlusion=unable to detect flow.

 

 

 

 

 

[facility], Workstation: [id number], 2/17/2025 7:29 PM

## 2025-02-17 NOTE — ED
General Adult HPI





- General


Chief complaint: Weakness


Stated complaint: Dizziness


Time Seen by Provider: 02/17/25 12:08


Source: EMS


Mode of arrival: EMS


Limitations: no limitations





- History of Present Illness


Initial comments: 


Dictation was produced using dragon dictation software. please excuse any 

grammatical, word or spelling errors. 











Chief Complaint: 89-year-old female presents to the ER for episode of strokelike

symptoms





History of Present Illness: Patient is an 89-year-old female she states she had 

to go to the bathroom in the middle the night.  She states that the floor was 

slippery.  She lowered herself to the ground and crawled to the bathroom.  

Daughter saw what she was doing was concern.  She told her to get up however she

is too weak.  EMS was called.  Daughter was concerned because she had 

approximately 10 to 15 minutes of slurred speech and altered mental status.  

Symptoms had since resolved since being picked up by EMS.  Patient denies any 

complaints at this time.  History of present illness obtained from the son








The ROS documented in this emergency department record has been reviewed and 

confirmed by me.  Those systems with pertinent positive or negative responses 

have been documented in the HPI.  All other systems are other negative and/or 

noncontributory.




















- Related Data


                                Home Medications











 Medication  Instructions  Recorded  Confirmed


 


Atorvastatin [Lipitor] 40 mg PO DAILY 11/22/22 02/17/25


 


Clopidogrel [Plavix] 75 mg PO DAILY 11/22/22 02/17/25


 


INSULIN LISPRO (HumaLOG) [humaLOG] 5 units SQ AC-TID 11/22/22 02/17/25


 


Levothyroxine Sodium [Synthroid] 50 mcg PO DAILY 11/22/22 02/17/25


 


Glucagon [Gvoke Pfs 1-Pack Syringe] 1 mg SQ AS DIRECTED PRN 02/17/25 02/17/25


 


Insulin Glargine (Lantus) [Lantus 12 unit SQ HS 02/17/25 02/17/25





Vial]   


 


Losartan Potassium [Cozaar] 100 mg PO DAILY 02/17/25 02/17/25











                                    Allergies











Allergy/AdvReac Type Severity Reaction Status Date / Time


 


No Known Allergies Allergy   Verified 02/17/25 12:16














Review of Systems


ROS Statement: 


Those systems with pertinent positive or pertinent negative responses have been 

documented in the HPI.





ROS Other: All systems not noted in ROS Statement are negative.





Past Medical History


Past Medical History: Diabetes Mellitus, Hyperlipidemia, Hypertension


History of Any Multi-Drug Resistant Organisms: None Reported


Past Surgical History: Heart Catheterization With Stent, Tonsillectomy


Date of Last Stent Placement:: May, 2022


Past Psychological History: No Psychological Hx Reported


Smoking Status: Never smoker


Past Alcohol Use History: None Reported


Past Drug Use History: None Reported





General Exam





- General Exam Comments


Initial Comments: 











PHYSICAL EXAM:


General Impression: Alert and oriented x3, not in acute distress


HEENT: Normocephalic atraumatic, extra-ocular movements intact, pupils equal and

 reactive to light bilaterally, mucous membranes moist.


Cardiovascular: Heart regular rate and rhythm


Chest: Able to complete full sentences, no retractions, no tachypnea


Abdomen: abdomen soft, non-tender, non-distended, no organomegaly


Musculoskeletal: Pulses present and equal in all extremities, no peripheral 

edema


Motor:  no focal deficits noted


Neurological: CN II-XII grossly intact, no focal motor or sensory deficits noted


Skin: Intact with no visualized rashes


Psych: Normal affect and mood











Limitations: no limitations





Course


                                   Vital Signs











  02/17/25





  10:55


 


Temperature 98.4 F


 


Pulse Rate 72


 


Respiratory 18





Rate 


 


Blood Pressure 159/62


 


O2 Sat by Pulse 98





Oximetry 














Medical Decision Making





- Medical Decision Making


Was pt. sent in by a medical professional or institution (, PA, NP, urgent 

care, hospital, or nursing home...) When possible be specific


@  -No


Did you speak to anyone other than the patient for history (EMS, parent, family,

 police, friend...)? What history was obtained from this source 


@  -Family as described above


Did you review nursing and triage notes (agree or disagree)?  Why? 


@  -I reviewed and agree with nursing and triage notes


Were old charts reviewed (outside hosp., previous admission, EMS record, old 

EKG, old radiological studies, urgent care reports/EKG's, nursing home records)?

 Report findings 


@  -No old charts were reviewed


Differential Diagnosis (chest pain, altered mental status, abdominal pain women,

 abdominal pain men, vaginal bleeding, musculoskeletal, weakness, fever, 

dyspnea, syncope, headache, dizziness, GI bleed, back pain, seizure, CVA, 

palpatations, mental health)? 


@  -Differential Altered Mental Status:


Hypoglycemia, DKA, hypercapnia, ETOH, overdose, CO poisoning, trauma, myxedema 

coma, HTN encephalopathy, infection, encephalitis, psychosis, intercranial 

hemorrhage, hepatic encephalopathy, meningitis, CVA, this is not meant to be an 

all-inclusive list





EKG interpreted by me (3pts min.).


@  -None done


X-rays interpreted by me (1pt min.).


@  -None done


CT interpreted by me (1pt min.).


@  -Brain shows no acute processes


U/S interpreted by me (1pt. min.).


@  -None done


What testing was considered but not performed or refused? (CT, X-rays, U/S, 

labs)? Why?


@  -None


What meds were considered but not given or refused? Why?


@  -None


Was smoking cessation discussed for >3mins.?


@  -No


Were there social determinants of health that impacted care today? How? 

(Homelessness, low income, unemployed, alcoholism, drug addiction, 

transportation, low edu. Level, literacy, decrease access to med. care, skilled nursing, 

rehab)?


@  -No


Was there de-escalation of care discussed even if they declined (Discuss DNR or 

withdrawal of care, Hospice)? DNR status


@  -No


What co-morbidities impacted this encounter? (DM, HTN, Smoking, COPD, CAD, 

Cancer, CVA, ARF, Chemo, Hep., AIDS, mental health diagnosis, sleep apnea, 

morbid obesity)?


@  -None


Was patient admitted / discharged? Hospital course, mention meds given and 

route, prescriptions, significant lab abnormalities, going to OR and other 

pertinent info.


@  -89-year-old female presents emergency department with episode of slurred 

speech altered mental status.  Daughter states that she witnesses was concerned 

she was having a stroke.  Patient symptoms had resolved by the time EMS brought 

patient to the ER.  Vital signs are stable.  Patient is well-appearing at the 

bedside.  Daughter is concerned that patient is super weak unable to care for 

self at home.  Laboratory evaluation obtained.  Labs within acceptable limits.  

Pending urine studies.  Disposition options discussed with daughter and patient 

at the bedside.  Daughter requested patient be admitted.  Is concerned that 

patient has suffered from transient ischemic attack.  Patient admitted.  Case 

discussed with hospitalist for admission.  Neurology will be consulted.  Patient

 given aspirin


Did you discuss the management of the patient with other professionals 

(professionals i.e. , PA, NP, lab, RT, psych nurse, , , 

teacher, , )? Give summary


@  -No


Was critical care preformed (if so, how long)?


@  -No


Undiagnosed new problem with uncertain prognosis?


@  -No


Drug Therapy requiring intensive monitoring for toxicity (Heparin, Nitro, Insuli

n, Cardizem)?


@  -No


Were any procedures done?


@  -No


Diagnosis/symptom?  Acute, or Chronic, or Acute on Chronic?  Uncomplicated 

(without systemic symptoms) or Complicated (systemic symptoms)?


@  -TIA


Side effects of treatment?


@  -No


Exacerbation, Progression, or Severe Exacerbation?


@  -No


Poses a threat to life or bodily function? How? (Chest pain, USA, MI, pneumonia,

 PE, COPD, DKA, ARF, appy, cholecystitis, CVA, Diverticulitis, Homicidal, 

Suicidal, threat to staff... and all critical care pts)


@  -yes











- Lab Data


Result diagrams: 


                                 02/17/25 12:52





                                 02/17/25 12:52


                                   Lab Results











  02/17/25 02/17/25 02/17/25 Range/Units





  10:59 12:52 12:52 


 


WBC   6.5   (3.8-10.6)  k/uL


 


RBC   3.02 L   (3.80-5.40)  m/uL


 


Hgb   10.9 L   (11.4-16.0)  gm/dL


 


Hct   32.8 L   (34.0-46.0)  %


 


MCV   108.5 H   (80.0-100.0)  fL


 


MCH   35.9 H   (25.0-35.0)  pg


 


MCHC   33.1   (31.0-37.0)  g/dL


 


RDW   12.4   (11.5-15.5)  %


 


Plt Count   169   (150-450)  k/uL


 


MPV   7.9   


 


Neutrophils %   84   %


 


Lymphocytes %   10   %


 


Monocytes %   4   %


 


Eosinophils %   1   %


 


Basophils %   0   %


 


Neutrophils #   5.5   (1.3-7.7)  k/uL


 


Lymphocytes #   0.6 L   (1.0-4.8)  k/uL


 


Monocytes #   0.3   (0-1.0)  k/uL


 


Eosinophils #   0.0   (0-0.7)  k/uL


 


Basophils #   0.0   (0-0.2)  k/uL


 


Macrocytosis   Moderate   


 


PT     (10.0-12.5)  sec


 


INR     (<1.2)  


 


APTT     (22.0-30.0)  sec


 


Sodium    134 L  (137-145)  mmol/L


 


Potassium    4.3  (3.5-5.1)  mmol/L


 


Chloride    102  ()  mmol/L


 


Carbon Dioxide    22  (22-30)  mmol/L


 


Anion Gap    10  mmol/L


 


BUN    25 H  (7-17)  mg/dL


 


Creatinine    0.93  (0.52-1.04)  mg/dL


 


Est GFR (CKD-EPI)AfAm    64  (>60 ml/min/1.73 sqM)  


 


Est GFR (CKD-EPI)NonAf    55  (>60 ml/min/1.73 sqM)  


 


Glucose    62 L  (74-99)  mg/dL


 


POC Glucose (mg/dL)  70    ()  mg/dL


 


POC Glu Operater ID  Dima Baird    


 


Calcium    8.6  (8.4-10.2)  mg/dL














  02/17/25 02/17/25 Range/Units





  12:52 13:35 


 


WBC    (3.8-10.6)  k/uL


 


RBC    (3.80-5.40)  m/uL


 


Hgb    (11.4-16.0)  gm/dL


 


Hct    (34.0-46.0)  %


 


MCV    (80.0-100.0)  fL


 


MCH    (25.0-35.0)  pg


 


MCHC    (31.0-37.0)  g/dL


 


RDW    (11.5-15.5)  %


 


Plt Count    (150-450)  k/uL


 


MPV    


 


Neutrophils %    %


 


Lymphocytes %    %


 


Monocytes %    %


 


Eosinophils %    %


 


Basophils %    %


 


Neutrophils #    (1.3-7.7)  k/uL


 


Lymphocytes #    (1.0-4.8)  k/uL


 


Monocytes #    (0-1.0)  k/uL


 


Eosinophils #    (0-0.7)  k/uL


 


Basophils #    (0-0.2)  k/uL


 


Macrocytosis    


 


PT  11.2   (10.0-12.5)  sec


 


INR  1.0   (<1.2)  


 


APTT  22.2   (22.0-30.0)  sec


 


Sodium    (137-145)  mmol/L


 


Potassium    (3.5-5.1)  mmol/L


 


Chloride    ()  mmol/L


 


Carbon Dioxide    (22-30)  mmol/L


 


Anion Gap    mmol/L


 


BUN    (7-17)  mg/dL


 


Creatinine    (0.52-1.04)  mg/dL


 


Est GFR (CKD-EPI)AfAm    (>60 ml/min/1.73 sqM)  


 


Est GFR (CKD-EPI)NonAf    (>60 ml/min/1.73 sqM)  


 


Glucose    (74-99)  mg/dL


 


POC Glucose (mg/dL)   110  ()  mg/dL


 


POC Glu Operater ID   Ziyad Abdalla  


 


Calcium    (8.4-10.2)  mg/dL














Disposition


Clinical Impression: 


 TIA (transient ischemic attack)





Disposition: ADMITTED AS IP TO THIS HOSP


Condition: Fair


Is patient prescribed a controlled substance at d/c from ED?: No


Referrals: 


None,Stated [REFERRING] - 1-2 days


Decision Time: 15:06

## 2025-02-18 LAB
CHOLEST SERPL-MCNC: 117 MG/DL (ref 0–200)
GLUCOSE BLD-MCNC: 106 MG/DL (ref 70–110)
GLUCOSE BLD-MCNC: 107 MG/DL (ref 70–110)
GLUCOSE BLD-MCNC: 279 MG/DL (ref 70–110)
GLUCOSE BLD-MCNC: 361 MG/DL (ref 70–110)
GLUCOSE BLD-MCNC: 466 MG/DL (ref 70–110)
GLUCOSE BLD-MCNC: 52 MG/DL (ref 70–110)
GLUCOSE UR QL: (no result)
HDLC SERPL-MCNC: 51.4 MG/DL (ref 40–60)
HYALINE CASTS UR QL AUTO: 4 /LPF (ref 0–2)
LDLC SERPL CALC-MCNC: 55 MG/DL (ref 0–131)
PH UR: 6 [PH] (ref 5–8)
PROT UR QL: (no result)
RBC UR QL: 1 /HPF (ref 0–5)
SP GR UR: 1.02 (ref 1–1.03)
SQUAMOUS UR QL AUTO: 1 /HPF (ref 0–4)
TRIGL SERPL-MCNC: 52.9 MG/DL (ref 0–149)
UROBILINOGEN UR QL STRIP: <2 MG/DL (ref ?–2)
VLDLC SERPL CALC-MCNC: 10.58 MG/DL (ref 5–40)
WBC #/AREA URNS HPF: 1 /HPF (ref 0–5)

## 2025-02-18 RX ADMIN — CLOPIDOGREL BISULFATE SCH MG: 75 TABLET ORAL at 09:01

## 2025-02-18 RX ADMIN — LEVOTHYROXINE SODIUM SCH MCG: 50 TABLET ORAL at 06:40

## 2025-02-18 RX ADMIN — FAMOTIDINE SCH MG: 10 INJECTION, SOLUTION INTRAVENOUS at 09:00

## 2025-02-18 RX ADMIN — INSULIN DETEMIR SCH UNIT: 100 INJECTION, SOLUTION SUBCUTANEOUS at 21:54

## 2025-02-18 RX ADMIN — LOSARTAN POTASSIUM SCH MG: 50 TABLET, FILM COATED ORAL at 09:01

## 2025-02-18 RX ADMIN — INSULIN ASPART ONE UNIT: 100 INJECTION, SOLUTION INTRAVENOUS; SUBCUTANEOUS at 18:07

## 2025-02-18 RX ADMIN — HEPARIN SODIUM SCH UNIT: 5000 INJECTION, SOLUTION INTRAVENOUS; SUBCUTANEOUS at 09:01

## 2025-02-18 RX ADMIN — INSULIN ASPART SCH: 100 INJECTION, SOLUTION INTRAVENOUS; SUBCUTANEOUS at 06:07

## 2025-02-18 RX ADMIN — DONEPEZIL HYDROCHLORIDE SCH MG: 5 TABLET ORAL at 21:54

## 2025-02-18 RX ADMIN — ATORVASTATIN CALCIUM SCH MG: 40 TABLET, FILM COATED ORAL at 09:01

## 2025-02-18 NOTE — P.PN
Subjective





This is a pleasant 89 years old female who presents because of episode of 

slurred speech, information were obtained with the help of the daughter at 

bedside


As per daughter her slurred speech resolved by the time EMS came to pick her up 

to the hospital.


Currently she denies any slurred speech blurred vision, weakness or numbness or 

tingling in extremities, no headache or dizziness


Also she slid out of her bed yesterday when she was trying to get up to go to 

the restroom


She denies specific urinary symptoms, no vomiting or diarrhea or abdominal pain.

 No chest pain or dyspnea


Patient also she has a history of forgetfulness, as per daughter patient will 

stand against the pictures talking to them and trying to feed them and would not

go out unless she taken with her


She fell at home last December and she has memory problem, she has been falling 

more over the last 4 once and the daughter is concerned about her safety and she

wants to talk to the 





Vitals are stable blood glucose was low on admission at 62


She has low hemoglobin at 10.9, rest of labs including CBC, BMP LFT and INR were

unremarkable


CT of the brain negative for acute process


Carotid duplex showing 50% stenosis at the bifurcation of both carotid arteries 

more on the right side





02/18


Patient is doing well, no more episode of slurred speech or weakness


Her sugar has been running low at 52 and later on today 106 and 107.


Hemoglobin A1c is also low at her age at 6.8%.


Most likely patient neurological symptoms are due to recurrent hypoglycemia.  We

lowered her Levemir to 5 units at bedtime and 3 units with meal.  Patient 

informed and she agrees


Carotid duplex is reviewed showing 50% stenosis of bilateral carotid artery at 

bifurcation


Echocardiogram is pending


Patient may be considered for discharge once cleared by neurologist


Patient passed swallow evaluation


PT/OT and  consult requested


TSH is normal at 3.9


B12 is pending











Review of systems


CONSTITUTIONAL: No fever, no malaise, no fatigue. 


HEENT: No recent visual problems or hearing problems. Denied any sore throat. 


CARDIOVASCULAR: No  orthopnea, PND, no palpitations, no syncope. 


PULMONARY: No shortness of breath, no cough, no hemoptysis. 


GASTROINTESTINAL: No diarrhea, no nausea, no vomiting, no abdominal pain. 

Normoactive bowel sounds. 


NEUROLOGICAL: No headaches, no weakness, no numbness. 


HEMATOLOGICAL: Denies any bleeding or petechiae. 


                               Active Medications











Generic Name Dose Route Start Last Admin





  Trade Name Freq  PRN Reason Stop Dose Admin


 


Atorvastatin Calcium  40 mg  02/18/25 09:00  02/18/25 09:01





  Atorvastatin 40 Mg Tab  PO   40 mg





  DAILY COLBY   Administration


 


Clopidogrel Bisulfate  75 mg  02/18/25 09:00  02/18/25 09:01





  Clopidogrel 75 Mg Tab  PO   75 mg





  DAILY COLBY   Administration


 


Donepezil HCl  5 mg  02/18/25 21:00 





  Donepezil 5 Mg Tab  PO  





  HS [facility]  


 


Famotidine  10 mg  02/18/25 09:00  02/18/25 09:00





  Famotidine 20 Mg/2 Ml Vial  IV   10 mg





  Q12HR COLBY   Administration


 


Heparin Sodium (Porcine)  5,000 unit  02/18/25 09:00  02/18/25 09:01





  Heparin Sodium,Porcine 5,000 Unit/Ml 1 Ml Vial  SQ   5,000 unit





  Q12HR COLBY   Administration


 


Insulin Aspart  3 unit  02/18/25 07:30  02/18/25 06:07





  Insulin Aspart (Novolog) 100 Unit/Ml Vial  0.05 unit/kg (3 unit)   Not Given





  SQ  





  AC-TID [facility]  


 


Insulin Detemir  5 unit  02/18/25 21:00 





  Insulin Detemir (Levemir) 100 Unit/Ml Syr  SQ  





  HS [facility]  


 


Levothyroxine Sodium  50 mcg  02/18/25 06:30  02/18/25 06:40





  Levothyroxine 50 Mcg Tab  PO   50 mcg





  DAILY@0630 COLBY   Administration


 


Losartan Potassium  100 mg  02/18/25 09:00  02/18/25 09:01





  Losartan 50 Mg Tab  PO   100 mg





  DAILY COLBY   Administration














Objective





- Vital Signs


Vital signs: 


                                   Vital Signs











Temp  98.2 F   02/18/25 07:10


 


Pulse  61   02/18/25 07:10


 


Resp  16   02/18/25 07:10


 


BP  152/55   02/18/25 07:10


 


Pulse Ox  98   02/18/25 07:10


 


FiO2      








                                 Intake & Output











 02/17/25 02/18/25 02/18/25





 18:59 06:59 18:59


 


Intake Total   118


 


Output Total  800 


 


Balance  -800 118


 


Weight 63.503 kg 63.503 kg 


 


Intake:   


 


  Oral   118


 


Output:   


 


  Urine  800 


 


Other:   


 


  # Voids  0 1


 


  # Bowel Movements   1














- Exam





GENERAL: The patient is alert and oriented x3, not in any acute distress. Well 

developed, well nourished. 


HEENT: Pupils are round and equally reacting to light. EOMI. No scleral icterus.

No conjunctival pallor. Normocephalic, atraumatic. No pharyngeal erythema. No 

thyromegaly. 


CARDIOVASCULAR: S1 and S2 present. No murmurs, rubs, or gallops. 


PULMONARY: Chest is clear to auscultation, no wheezing , no crackles. 


ABDOMEN: Soft, nontender, nondistended, normoactive bowel sounds. No palpable 

organomegaly. 


MUSCULOSKELETAL: No joint swelling or deformity. 


EXTREMITIES: No cyanosis, clubbing, or pedal edema. 


NEUROLOGICAL: Gross neurological examination did not reveal any focal deficits. 


SKIN: No rashes. no petechiae.








- Labs


CBC & Chem 7: 


                                 02/17/25 12:52





                                 02/17/25 12:52


Labs: 


                  Abnormal Lab Results - Last 24 Hours (Table)











  02/17/25 02/17/25 02/17/25 Range/Units





  12:52 12:52 21:04 


 


RBC  3.02 L    (3.80-5.40)  m/uL


 


Hgb  10.9 L    (11.4-16.0)  gm/dL


 


Hct  32.8 L    (34.0-46.0)  %


 


MCV  108.5 H    (80.0-100.0)  fL


 


MCH  35.9 H    (25.0-35.0)  pg


 


Lymphocytes #  0.6 L    (1.0-4.8)  k/uL


 


Sodium   134 L   (137-145)  mmol/L


 


BUN   25 H   (7-17)  mg/dL


 


Glucose   62 L   (74-99)  mg/dL


 


POC Glucose (mg/dL)    186 H  ()  mg/dL


 


Hemoglobin A1c     (<=6.0)  %


 


Urine Protein     (Negative)  


 


Urine Glucose (UA)     (Negative)  


 


Hyaline Casts     (0-2)  /lpf


 


Urine Mucus     (None)  /hpf














  02/18/25 02/18/25 02/18/25 Range/Units





  03:27 04:45 05:50 


 


RBC     (3.80-5.40)  m/uL


 


Hgb     (11.4-16.0)  gm/dL


 


Hct     (34.0-46.0)  %


 


MCV     (80.0-100.0)  fL


 


MCH     (25.0-35.0)  pg


 


Lymphocytes #     (1.0-4.8)  k/uL


 


Sodium     (137-145)  mmol/L


 


BUN     (7-17)  mg/dL


 


Glucose     (74-99)  mg/dL


 


POC Glucose (mg/dL)    52 L  ()  mg/dL


 


Hemoglobin A1c   6.8 H   (<=6.0)  %


 


Urine Protein  2+ H    (Negative)  


 


Urine Glucose (UA)  3+ H    (Negative)  


 


Hyaline Casts  4 H    (0-2)  /lpf


 


Urine Mucus  Rare H    (None)  /hpf














Assessment and Plan


Assessment: 





Episode of slurred speech suspicious for TIA, highly suspected secondary to 

recurrent hypoglycemia


Diabetes mellitus with hypoglycemia on admission.


History of fall last December


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


Chronic anemia


Hypothyroidism on treatment








Plan: 








Continue with home dose of Plavix


Lower dose of Levemir 12 units down to 5 units and NovoLog-is down to 3 units 

and check hemoglobin A1c


Follow-up B12 and folate results which are still pending


Echocardiogram is pending


Check urine analysis and bladder scan


Neurology consult


 consult


GI prophylaxis: Pepcid


DVT prophylaxis heparin


Prognosis guarded

## 2025-02-19 LAB
GLUCOSE BLD-MCNC: 153 MG/DL (ref 70–110)
GLUCOSE BLD-MCNC: 155 MG/DL (ref 70–110)
GLUCOSE BLD-MCNC: 253 MG/DL (ref 70–110)
GLUCOSE BLD-MCNC: 329 MG/DL (ref 70–110)

## 2025-02-19 RX ADMIN — INSULIN ASPART SCH UNIT: 100 INJECTION, SOLUTION INTRAVENOUS; SUBCUTANEOUS at 13:07

## 2025-02-19 NOTE — P.CNNES
History of Present Illness


Consult date: 02/18/25


Requesting physician: Mushtaq Sauer


Reason for Consult: TIA


History of Present Illness: 





Patient is a 89-year-old right-handed female, who came to the hospital by 

ambulance yesterday at 10:52 AM.  Patient states that all came to the hospital 

because she was not feeling well.  She was afraid she was going to fall.  She 

states she has history of diabetes since age 52.  She states now she is age 81. 

I told her that she is not is 81, but she would not believe.  Even telling that 

she is 89 years of age, she was somewhat bewildered.  Patient could not tell her

date of birth, knows that she was born in [birth year] but could not tell the year, 

stated perhaps 1987.  Patient not able to provide any history.  EMS flowsheet 

not available in the chart.





As per report from the ED, patient was brought to the hospital for strokelike 

symptoms.  Patient reported in the ED that she had to go to the bathroom in the 

middle of the night, and she states that the floor was slippery.  She lowered 

herself to the ground and crawled to the bathroom.  Daughter mentioned that what

she was doing was a concern.  Daughter told her to get up, however she was too 

weak.  Therefore they called the ambulance.  Daughter also noticed that patient 

had 10 to 15 minutes of slurred speech and altered mental status.





Vital signs on arrival blood pressure 159/62, pulse rate 72 temperature 98.4.  

She has been afebrile.  Blood test shows normal WBC hemoglobin 10.9 with 

elevated MCV one 8.5.  Platelets are normal.  PT PTT normal, sodium 134 

potassium 4.3, normal renal functions.  UA is negative.  CT head showed no acute

intracranial process.  Nonspecific white matter changes, likely secondary to 

chronic small vessel ischemic disease.  I personally reviewed CT head, agree 

with the findings.  Visualized paranasal sinuses and external auditory canals 

are all clear.





Patient's home medications include Plavix 75 mg, Lipitor 40 mg, insulin, 

levothyroxine.





Patient states that she lives with her sister.  She does have a son and a daugh

ter.  She walks by herself.  She uses cane only if she feels unsteady.  Patient 

denies any headache at this time.





I spoke to patient's daughter on the phone, who states that patient's memory 

loss has been going on since 2022.  She used to live in [place] and their 

neighbor used to check on her on a daily basis.  1 day she was found on the 

floor and was found to have very high blood sugars.  She was in ICU for about 12

days.  She had a stent placed in the heart.  She was released home and since 

then patient is not the same person that patient's daughter knew about.  She 

would not make good decisions.  Whenever she is supervised, she does well, but 

when she is left on her own, she does not follow through.  She would not look 

for food unless it is given to her.  In November 2023 she was hospitalized at 

[facility] for unstable diabetes and she was thinking that people in the

TV are watching her while she was changing.  Patient's daughter states that on 

the day of admission, her son found her on the floor, on her stomach crawling to

the bathroom.  Patient has been more confused lately, as she was trying to feed 

people in the pictures, and making sure that people in the pictures have blanket

on.  Patient has history of diabetes for 30 years, and she had a red bottle in 

the blue bottle for different type of insulins to be taken.  The doses have not 

changed for a long time.  But lately she has been mixing up, taking the wrong 

insulin.  She would not remember the doses although the doses have not changed 

either.  On Saturday she was out to dinner with her son and she wanted to feed 

the people on the picture on the wall.  Her diabetes has been very brittle, goi

ng from hypoglycemia to hyperglycemia easily.





Review of Systems





All pertinent positive and negative review of systems mentioned in the HPI.





Past Medical History


Past Medical History: Diabetes Mellitus, Hyperlipidemia, Hypertension


History of Any Multi-Drug Resistant Organisms: None Reported


Past Surgical History: Heart Catheterization With Stent, Tonsillectomy


Date of Last Stent Placement:: May, 2022


Past Psychological History: No Psychological Hx Reported


Smoking Status: Never smoker


Past Alcohol Use History: None Reported


Past Drug Use History: None Reported





Medications and Allergies


                                Home Medications











 Medication  Instructions  Recorded  Confirmed  Type


 


Atorvastatin [Lipitor] 40 mg PO DAILY 11/22/22 02/17/25 History


 


Clopidogrel [Plavix] 75 mg PO DAILY 11/22/22 02/17/25 History


 


INSULIN LISPRO (HumaLOG) [humaLOG] 5 units SQ AC-TID 11/22/22 02/17/25 History


 


Levothyroxine Sodium [Synthroid] 50 mcg PO DAILY 11/22/22 02/17/25 History


 


Glucagon [Gvoke Pfs 1-Pack Syringe] 1 mg SQ AS DIRECTED PRN 02/17/25 02/17/25 

History


 


Insulin Glargine (Lantus) [Lantus 12 unit SQ HS 02/17/25 02/17/25 History





Vial]    


 


Losartan Potassium [Cozaar] 100 mg PO DAILY 02/17/25 02/17/25 History








                                    Allergies











Allergy/AdvReac Type Severity Reaction Status Date / Time


 


No Known Allergies Allergy   Verified 02/17/25 12:16














Physical Examination





- Vital Signs


Vital Signs: 


                                   Vital Signs











  Temp Pulse Pulse Resp BP BP BP


 


 02/18/25 15:00  98.1 F   55 L  16    128/64


 


 02/18/25 07:10  98.2 F   61  16    152/55


 


 02/18/25 02:39  98.1 F   62  15   142/71 


 


 02/17/25 20:15   77   18  136/51  


 


 02/17/25 20:00  98.8 F   66  15   152/66 














  Pulse Ox


 


 02/18/25 15:00  100


 


 02/18/25 07:10  98


 


 02/18/25 02:39  98


 


 02/17/25 20:15  99


 


 02/17/25 20:00  97








                                Intake and Output











 02/18/25 02/18/25 02/18/25





 06:59 14:59 22:59


 


Intake Total  118 


 


Output Total 800  


 


Balance -800 118 


 


Intake:   


 


  Oral  118 


 


Output:   


 


  Urine 800  


 


Other:   


 


  Voiding Method  Toilet 


 


  # Voids  1 1


 


  # Bowel Movements  1 


 


  Weight 63.503 kg  














Patient is an elderly  female, in no acute distress.


Patient is alert awake, but very disoriented.  Patient states that she is 81 

years of age, despite telling her that she is 89.  She knows that she was born 

in the month of [birth year] but believes it was 1987.  She does not know the current 

month or the year or what building she is in, on giving some options, states ? 

Nursing home.  She knows that she is in [place] in [place].  Patient could 

not tell name of the current president and when I asked it was Anthony, patient 

says, was the last president.  On giving multiple choices of different 

presidents, for the current president, patient said "might be Trump".  Speech 

and language functions are normal.  Patient can name and repeat very well.  No 

aphasia or dysarthria.  Attention, concentration is intact and fund of knowledge

 is very limited patient has.  Positive visual spatial apraxia, negative 

palmomental reflex.  Patient has a bruise over the left lower lip, which she 

states occurred when the paramedics accidentally elbowed her lip.





On cranial nerve examination, pupils are equal, round and reacting to light, 

visual fields are full on confrontation, with no neglect on double simultaneous 

stimulation.  Extraocular muscles are intact with no nystagmus.  Face is 

symmetric, tongue protrudes to the midline.  Palatal elevation and sensation 

normal, hearing is moderately decreased.  Her shoulder shrug normal, facial 

sensation normal.  





On muscle strength testing, there is no pronator drift and the strength is 

normal in arms and legs distally and proximally.





Deep tendon reflexes are symmetric 1 all over and plantars downgoing.





Sensory to touch is equal with no neglect on double simultaneous stimulation.





Cerebellar function showed no ataxia for finger-to-nose testing.  No 

dysdiadochokinesia.  No ataxia for heel-to-shin testing on either side.  Tone 

and bulk of muscles normal.





Gait deferred..





On general examination, there is no carotid bruit or murmur, S1-S2 audible.  

Chest is clear on consultation.  Abdomen is soft nontender.  No organomegaly, 

bowel sounds present.   Peripheral pulses are present.  No peripheral edema.  

Patient has bruises over the forearms.





Results





- Laboratory Findings


CBC and BMP: 


                                 02/17/25 12:52





                                 02/17/25 12:52


Abnormal Lab Findings: 


                                  Abnormal Labs











  02/17/25 02/17/25 02/17/25





  12:52 12:52 21:04


 


RBC  3.02 L  


 


Hgb  10.9 L  


 


Hct  32.8 L  


 


MCV  108.5 H  


 


MCH  35.9 H  


 


Lymphocytes #  0.6 L  


 


Sodium   134 L 


 


BUN   25 H 


 


Glucose   62 L 


 


POC Glucose (mg/dL)    186 H


 


Hemoglobin A1c   


 


Urine Protein   


 


Urine Glucose (UA)   


 


Hyaline Casts   


 


Urine Mucus   














  02/18/25 02/18/25 02/18/25





  03:27 04:45 05:50


 


RBC   


 


Hgb   


 


Hct   


 


MCV   


 


MCH   


 


Lymphocytes #   


 


Sodium   


 


BUN   


 


Glucose   


 


POC Glucose (mg/dL)    52 L


 


Hemoglobin A1c   6.8 H 


 


Urine Protein  2+ H  


 


Urine Glucose (UA)  3+ H  


 


Hyaline Casts  4 H  


 


Urine Mucus  Rare H  














  02/18/25 02/18/25





  12:38 17:36


 


RBC  


 


Hgb  


 


Hct  


 


MCV  


 


MCH  


 


Lymphocytes #  


 


Sodium  


 


BUN  


 


Glucose  


 


POC Glucose (mg/dL)  279 H  466 H


 


Hemoglobin A1c  


 


Urine Protein  


 


Urine Glucose (UA)  


 


Hyaline Casts  


 


Urine Mucus  














Assessment and Plan


Assessment: 





* Possible TIA


* Progressive memory loss for last 3 years, getting worse.  Probable Alzheimer's

   dementia.


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 

  range.


* Hypertension


* Hyperlipidemia


* Coronary artery disease











Plan: 








* Patient is undergoing workup for TIA.


* MRI brain, rule out CVA.


* 2-D echo revealed mildly impaired left ventricular function with EF between 40

   to 45%.  Mildly increased septal wall thickness.  Mildly increased posterior 

  wall thickness.  Severely increased left ventricular systolic volume.  Normal 

  right atrial size.  Negative agitated saline bubble study for right-to-left 

  shunt.  Mildly increased left atrial diameter.  Mild aortic stenosis.





* Carotid Doppler, revealed atheromatous plaquing present bilaterally.  There is

   approximately 50% narrowing at the bilateral carotid bifurcations, slightly 

  greater on the left than right.  Antegrade flow in both vertebral arteries.


* Fasting a.m. lipid panel, with cholesterol 117, LDL 55, HDL 51 and triglyc

  erides 52.  Continue Lipitor 40 mg daily (home dose).


* Hemoglobin A1c 6.8, diabetes well-controlled.  Avoid episodes of hypoglycemia.


* Optimize control of blood pressure.


* EEG for rapidly progressive memory loss.


* Continue Plavix 75 mg daily (home dose)


* Agree with starting donepezil 5 mg daily for dementia


* Neuro checks every 4 hours.


* Telemetry monitoring rule out any arrhythmia


* PT, OT, speech therapy


* DVT prophylaxis: Heparin 5000 units subcu every 12 hours


* Discussed with patient's daughter in detail.


* Neurology will continue to follow.  Thank you for the consult.








Time with Patient: Greater than 30

## 2025-02-19 NOTE — EEG
ELECTROENCEPHALOGRAM REPORT



PREAMBLE:

This is an 89-year-old female, who came with syncopal episode.  The patient also
has

some altered mental status.  This study is performed to evaluate for any 
epileptiform

activity.



EEG FINDINGS:

This is a 21-channel digital EEG recorded with video component, utilizing 10/20

international system with referential and bipolar montages.  Background consists
of

well developed, well regulated moderate voltage activity in about 8 hertz alpha.

Background is posterior dominant and reactive to eye opening and closing.  Some

drowsiness was seen with appearance of bilaterally symmetric theta frequency 
rhythm.

Photic driving response was seen with some flash frequencies.  No focal or 
generalized

epileptiform activity was seen.  



IMPRESSION:  

This is a normal awake and drowsy EEG for the

patient's age.  No focal, lateralized, or epileptiform activity was seen.





MMODL / IJN: [id number] / Job#: [id number]

MTDD

## 2025-02-19 NOTE — CA
Transthoracic Echo Report 

 Name: Yumiko Thomas 

 MRN:    [id number] 

 Age:    89     Gender:     F 

 

 :    1935 

 Exam Date:     2025 09:53 

 Exam Location: [facility] 

 Ht (in):     61     Wt (lb):     140 

 Ordering Physician:        Ramin Harrell MD 

 Attending/Referring Phys: 

 Technician         Susan Mcleod RDCS 

 Procedure CPT: 

 Indications:       Chest Pain 

 

 Cardiac Hx: 

 Technical Quality:      63 

 Contrast 1:    Agitated Saline             Total Dose (mL):      9 

 Contrast 2:                                Total Dose (mL): 

 

 MEASUREMENTS  (Male / Female) Normal Values 

 2D ECHO 

 LV Diastolic Diameter PLAX        4.9 cm                4.2 - 5.9 / 3.9 - 5.3 cm 

 LV Systolic Diameter PLAX         4.0 cm                 

 IVS Diastolic Thickness           1.1 cm                0.6 - 1.0 / 0.6 - 0.9 cm 

 LVPW Diastolic Thickness          1.0 cm                0.6 - 1.0 / 0.6 - 0.9 cm 

 LV Relative Wall Thickness        0.4                    

 RV Internal Dim ED PLAX           3.1 cm                 

 LVOT Diameter                     2.3 cm                 

 LA Systolic Diameter LX           4.0 cm                3.0 - 4.0 / 2.7 - 3.8 cm 

 LV Diastolic Volume MOD BP        127.9 cm???             67 - 155 / 56 - 104 cm??? 

 LV Systolic Volume MOD BP         71.2 cm???              22 - 58 / 19 - 49 cm??? 

 LV Ejection Fraction MOD BP       44.3 %                >= 55  % 

 LV Diastolic Volume MOD 4C        111.3 cm???              

 LV Systolic Volume MOD 4C         68.0 cm???               

 LV Ejection Fraction MOD 4C       38.9 %                 

 LV Diastolic Length 4C            7.0 cm                 

 LV Systolic Length 4C             6.7 cm                 

 LV Diastolic Volume MOD 2C        136.8 cm???              

 LV Systolic Volume MOD 2C         72.3 cm???               

 LV Ejection Fraction MOD 2C       47.1 %                 

 LV Diastolic Length 2C            7.6 cm                 

 LV Systolic Length 2C             6.3 cm                 

 LA Volume                         70.7 cm???              18 - 58 / 22 - 52 cm??? 

 LA Volume Index                   42.3 cm???/m???           16 - 28 cm???/m??? 

 

 M-MODE 

 Aortic Root Diameter MM           3.3 cm                 

 

 DOPPLER 

 AV Peak Velocity                  191.5 cm/s             

 AV Peak Gradient                  14.7 mmHg              

 AI Peak Velocity                  312.4 cm/s             

 AI Peak Gradient                  39.0 mmHg              

 AI Pressure Half Time             774.7 ms               

 MV Area PHT                       3.0 cm???                

 Mitral E Point Velocity           119.6 cm/s             

 Mitral A Point Velocity           97.7 cm/s              

 Mitral E to A Ratio               1.2                    

 MV Deceleration Time              256.1 ms               

 TR Peak Velocity                  301.7 cm/s             

 TR Peak Gradient                  36.4 mmHg              

 Right Ventricular Systolic Press  40.9 mmHg              

 

 

 FINDINGS 

 Left Ventricle 

 Left ventricular ejection fraction is estimated at 40-45 %.  Mildly increased  

 septal wall thickness. Mildly increased posterior wall thickness. Moderately  

 increased left ventricular diastolic volume. Severely increased left  

 ventricular systolic volume. Moderately decreased left ventricular ejection  

 fraction. 

 

 Right Ventricle 

 Normal right ventricular size. Mild pulmonary hypertension. 

 

 Right Atrium 

 Normal right atrial size. No right atrial thrombus or mass seen. Negative  

 agitated saline bubble study for right to left shunt. 

 

 Left Atrium 

 Mildly increased left atrial diameter. Moderately increased left atrial volume.  

 Mildly increased left atrial area. No left atrial thrombus or mass present. 

 

 Mitral Valve 

 Structurally normal mitral valve. Mild mitral regurgitation. 

 

 Aortic Valve 

 Trileaflet aortic valve. Thickened aortic valve without stenosis. Mild aortic  

 regurgitation. 

 

 Tricuspid Valve 

 Structurally normal tricuspid valve. Mild tricuspid regurgitation. 

 

 Pulmonic Valve 

 Structurally normal pulmonic valve. Mild pulmonic regurgitation. 

 

 Pericardium 

 No pericardial effusion. 

 

 Aorta 

 Normal size aortic root and proximal ascending aorta. 

 

 CONCLUSIONS 

 Mildly impaired LV function with EF between 40 to 45% 

 Mild pulmonary hypertension 

 Aortic sclerosis with mild stenosis and mild insufficiency 

 No pericardial effusion 

 Previewed by:  

 Dr. Kaleb Camacho MD 

 (Electronically Signed) 

 Final Date:      2025 07:26

## 2025-02-19 NOTE — P.PN
Subjective


Progress Note Date: 02/19/25





Hospital Course: 








2/19/25


Patient seen and examined at bedside.  Per RN, patient was pacing up and down 

the halls yesterday and sat at the nurses station for a few hours.  After given 

some Seroquel, patient fell asleep.  Sitter is present in the room.  Patient 

denies any acute complaints this morning.  She seems to be somewhat confused, 

but not in any acute distress.





Pertinent positives and negatives discussed above, a complete review of systems 

was preformed and all the other systems were negative.





Vitals Signs Reviewed.








General: non toxic, no distress, appears at stated age, normal weight


Derm: no unusual rashes/lesions, warm


Head: atraumatic, normocephalic, symmetric


Eyes: EOMI, anicteric sclera, pupils equal round reactive to light


ENT: Nose and ears atraumatic


Neck: No cervical lymphadenopathy, trachea midline, supple


Mouth: no lip lesion, mucus membranes moist


Cardiovascular: S1S2 reg, no murmur, positive dorsalis pedis pulse bilateral, no

edema


Lungs: Equal  air entry bilaterally, no rhonchi, no rales, no accessory muscle 

use


Abdominal: soft,  nontender to palpation, no guarding


Ext: muscle strength 5 out of 5 in all 4 extremities grossly, no gross muscle 

atrophy, no contractures,


Neuro:  CN II-XI grossly intact, no gross focal neuro deficits


Psych: Alert to person but not place or time





Data Reviewed Today:





Patient Labs: No new labs





Imaging: No new imaging








Assessment and Plan:





Episode of slurred speech suspicious for TIA, highly suspected secondary to 

recurrent hypoglycemia


History of fall last December


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


Continue aspirin 81 mg daily


Continue Plavix 75mg daily


Continue Lipitor 40 mg daily


Neurochecks per protocol


Consult neurology


Consult PT/OT 


Consult speech therapy


Echocardiogram EF of 40 to 45%


Cardiac monitoring


Follow-up MRI


Follow-up EEG





Diabetes mellitus with hypoglycemia on admission, A1c 6.8


Accu-Cheks per ACHS protocol


Levemir 5 units subcu at bedtime


Low-dose insulin sliding scale


Hypoglycemia precautions





Hypothyroidism


Continue Synthroid 50 mcg daily





Hypertension


Continue Cozaar 100 mg daily





GERD


Continue Pepcid 10 mg IV every 12 hours





F none





E replete as needed





N consistent carbohydrate





A fall precautions





DVT ppx: Heparin 5000 units every 12 hours





Code Status: No code


Discussed with: Patient


Anticipated discharge place: Pending clinical course


Anticipated discharge time: Pending clinical





Attestation


I have seen and examined this patient with my resident , discussed the same with

the resident/STEPHANIE, and  agree with the dictator's assessment and plan as written 





GENERAL: The patient is alert to self,


HEENT: Pupils are round and equally reacting to light. EOMI. No scleral icterus.

No conjunctival pallor. Normocephalic, atraumatic. No pharyngeal erythema. No 

thyromegaly. 


CARDIOVASCULAR: S1 and S2 present. No murmurs, rubs, or gallops. 


PULMONARY: Chest is clear to auscultation, no wheezing or crackles. 


ABDOMEN: Soft, nontender, nondistended, normoactive bowel sounds. No palpable 

organomegaly. 


MUSCULOSKELETAL: No joint swelling or deformity.


EXTREMITIES: No cyanosis, clubbing, or pedal edema. 


NEUROLOGICAL: Gross neurological examination did not reveal any focal deficits. 


SKIN: No rashes. 








Dr. Dhruv urbina











Objective





- Vital Signs


Vital signs: 


                                   Vital Signs











Temp  98.3 F   02/19/25 08:25


 


Pulse  73   02/19/25 08:25


 


Resp  16   02/19/25 08:25


 


BP  173/74   02/19/25 08:25


 


Pulse Ox  98   02/19/25 08:25


 


FiO2      








                                 Intake & Output











 02/18/25 02/19/25 02/19/25





 18:59 06:59 18:59


 


Intake Total 118  118


 


Balance 118  118


 


Intake:   


 


  Oral 118  118


 


Other:   


 


  Voiding Method Toilet Toilet Toilet


 


  # Voids 1 2 


 


  # Bowel Movements 1  














- Labs


CBC & Chem 7: 


                                 02/20/25 08:20





                                 02/20/25 08:20


Labs: 


                  Abnormal Lab Results - Last 24 Hours (Table)











  02/18/25 02/18/25 02/19/25 Range/Units





  17:36 20:30 08:28 


 


POC Glucose (mg/dL)  466 H  361 H  155 H  ()  mg/dL














  02/19/25 Range/Units





  12:16 


 


POC Glucose (mg/dL)  153 H  ()  mg/dL

## 2025-02-20 LAB
ANION GAP SERPL CALC-SCNC: 6 MMOL/L
BASOPHILS # BLD AUTO: 0 K/UL (ref 0–0.2)
BASOPHILS NFR BLD AUTO: 0 %
BUN SERPL-SCNC: 25 MG/DL (ref 7–17)
CALCIUM SPEC-MCNC: 8.4 MG/DL (ref 8.4–10.2)
CHLORIDE SERPL-SCNC: 101 MMOL/L (ref 98–107)
CO2 SERPL-SCNC: 27 MMOL/L (ref 22–30)
EOSINOPHIL # BLD AUTO: 0.2 K/UL (ref 0–0.7)
EOSINOPHIL NFR BLD AUTO: 6 %
ERYTHROCYTE [DISTWIDTH] IN BLOOD BY AUTOMATED COUNT: 3.35 M/UL (ref 3.8–5.4)
ERYTHROCYTE [DISTWIDTH] IN BLOOD: 12.7 % (ref 11.5–15.5)
GLUCOSE BLD-MCNC: 167 MG/DL (ref 70–110)
GLUCOSE BLD-MCNC: 327 MG/DL (ref 70–110)
GLUCOSE BLD-MCNC: 361 MG/DL (ref 70–110)
GLUCOSE BLD-MCNC: 397 MG/DL (ref 70–110)
GLUCOSE BLD-MCNC: 67 MG/DL (ref 70–110)
GLUCOSE SERPL-MCNC: 214 MG/DL (ref 74–99)
HCT VFR BLD AUTO: 36.4 % (ref 34–46)
HGB BLD-MCNC: 11.8 GM/DL (ref 11.4–16)
LYMPHOCYTES # SPEC AUTO: 0.7 K/UL (ref 1–4.8)
LYMPHOCYTES NFR SPEC AUTO: 21 %
MCH RBC QN AUTO: 35.2 PG (ref 25–35)
MCHC RBC AUTO-ENTMCNC: 32.4 G/DL (ref 31–37)
MCV RBC AUTO: 108.7 FL (ref 80–100)
MONOCYTES # BLD AUTO: 0.2 K/UL (ref 0–1)
MONOCYTES NFR BLD AUTO: 5 %
NEUTROPHILS # BLD AUTO: 2.1 K/UL (ref 1.3–7.7)
NEUTROPHILS NFR BLD AUTO: 66 %
PLATELET # BLD AUTO: 199 K/UL (ref 150–450)
POTASSIUM SERPL-SCNC: 4.7 MMOL/L (ref 3.5–5.1)
SODIUM SERPL-SCNC: 134 MMOL/L (ref 137–145)
WBC # BLD AUTO: 3.3 K/UL (ref 3.8–10.6)

## 2025-02-20 NOTE — P.PN
Subjective


Progress Note Date: 02/19/25





Patient was seen for a follow-up.  Patient is laying comfortably in the bed.  

Still very disoriented, does not know the month or the year or the name of the 

building.





Objective





- Vital Signs


Vital signs: 


                                   Vital Signs











Temp  98.6 F   02/19/25 16:00


 


Pulse  63   02/19/25 16:00


 


Resp  16   02/19/25 16:00


 


BP  155/59   02/19/25 16:00


 


Pulse Ox  95   02/19/25 16:00


 


FiO2      








                                 Intake & Output











 02/18/25 02/19/25 02/19/25





 18:59 06:59 18:59


 


Intake Total 118  118


 


Balance 118  118


 


Intake:   


 


  Oral 118  118


 


Other:   


 


  Voiding Method Toilet Toilet Toilet


 


  # Voids 1 2 2


 


  # Bowel Movements 1  














- Exam





Examination unchanged.  Mentation stable.





- Labs


CBC & Chem 7: 


                                 02/20/25 08:20





                                 02/20/25 08:20


Labs: 


                  Abnormal Lab Results - Last 24 Hours (Table)











  02/18/25 02/18/25 02/19/25 Range/Units





  17:36 20:30 08:28 


 


POC Glucose (mg/dL)  466 H  361 H  155 H  ()  mg/dL














  02/19/25 02/19/25 Range/Units





  12:16 17:34 


 


POC Glucose (mg/dL)  153 H  329 H  ()  mg/dL














Assessment and Plan


Assessment: 





* Possible TIA


* Progressive memory loss for last 3 years, getting worse.  Probable Alzheimer's

  dementia, at least moderate to severe degree.


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 

  range.


* Hypertension


* Hyperlipidemia


* Coronary artery disease











Plan: 








* Patient is undergoing workup for TIA.


* Await MRI brain, rule out CVA.


* 2-D echo revealed mildly impaired left ventricular function with EF between 40

  to 45%.  Mildly increased septal wall thickness.  Mildly increased posterior 

  wall thickness.  Severely increased left ventricular systolic volume.  Normal 

  right atrial size.  Negative agitated saline bubble study for right-to-left 

  shunt.  Mildly increased left atrial diameter.  Mild aortic stenosis.





* Carotid Doppler, revealed atheromatous plaquing present bilaterally.  There is

  approximately 50% narrowing at the bilateral carotid bifurcations, slightly 

  greater on the left than right.  Antegrade flow in both vertebral arteries.


* Fasting a.m. lipid panel, with cholesterol 117, LDL 55, HDL 51 and 

  triglycerides 52.  Continue Lipitor 40 mg daily (home dose).


* Hemoglobin A1c 6.8, diabetes well-controlled.  Avoid episodes of hypoglycemia.


* B12 554 on 2/26/2024 and folate > 20, TSH 3.94


* Optimize control of blood pressure.


* EEG was normal awake and drowsy for patient's age.  No focal, lateralized or 

  epileptiform activity was seen.


* Continue Plavix 75 mg daily (home dose)


* Agree with starting donepezil 5 mg daily for dementia.  After 1 month, 

  increase dose to 10 mg.  Patient probably will need Namenda in the future.


* Neuro checks every 4 hours.


* Telemetry monitoring rule out any arrhythmia


* PT, OT, speech therapy


* DVT prophylaxis: Heparin 5000 units subcu every 12 hours


* Neurologically clear, if the MRI comes back normal.

## 2025-02-20 NOTE — P.PN
Subjective


Progress Note Date: 02/20/25





Hospital Course: 


This is a pleasant 89 years old female who presents because of episode of 

slurred speech, information were obtained with the help of the daughter at 

bedside


As per daughter her slurred speech resolved by the time EMS came to pick her up 

to the hospital.


Currently she denies any slurred speech blurred vision, weakness or numbness or 

tingling in extremities, no headache or dizziness


Also she slid out of her bed yesterday when she was trying to get up to go to 

the restroom


She denies specific urinary symptoms, no vomiting or diarrhea or abdominal pain.

 No chest pain or dyspnea


Patient also she has a history of forgetfulness, as per daughter patient will 

stand against the pictures talking to them and trying to feed them and would not

go out unless she taken with her


She fell at home last December and she has memory problem, she has been falling 

more over the last 4 once and the daughter is concerned about her safety and she

wants to talk to the 





Vitals are stable blood glucose was low on admission at 62


She has low hemoglobin at 10.9, rest of labs including CBC, BMP LFT and INR were

unremarkable


CT of the brain negative for acute process


Carotid duplex showing 50% stenosis at the bifurcation of both carotid arteries 

more on the right side











2/19/25


Patient seen and examined at bedside.  Per RN, patient was pacing up and down 

the halls yesterday and sat at the nurses station for a few hours.  After given 

some Seroquel, patient fell asleep.  Sitter is present in the room.  Patient 

denies any acute complaints this morning.  She seems to be somewhat confused, 

but not in any acute distress.





2/20/25


Patient seen and examined at bedside.  Sitter present in the room.  Patient is 

still only oriented to self not place or time.  She is resting comfortably in 

bed.  She denies any acute complaints this morning





Pertinent positives and negatives discussed above, a complete review of systems 

was preformed and all the other systems were negative.





Vitals Signs Reviewed.








General: non toxic, no distress, appears at stated age, normal weight


Derm: no unusual rashes/lesions, warm


Head: atraumatic, normocephalic, symmetric


Eyes: EOMI, anicteric sclera, pupils equal round reactive to light


ENT: Nose and ears atraumatic


Neck: No cervical lymphadenopathy, trachea midline, supple


Mouth: no lip lesion, mucus membranes moist


Cardiovascular: S1S2 reg, systolic murmur, positive dorsalis pedis pulse 

bilateral, no edema


Lungs: Equal  air entry bilaterally, no rhonchi, no rales, no accessory muscle 

use


Abdominal: soft,  nontender to palpation, no guarding


Ext: muscle strength 5 out of 5 in all 4 extremities grossly, no gross muscle 

atrophy, no contractures


Neuro:  CN II-XI grossly intact, no gross focal neuro deficits


Psych: Alert to person but not place or time





Data Reviewed Today:





Patient Labs: No new labs





Imaging: No new imaging








Assessment and Plan:





Episode of slurred speech suspicious for TIA, highly suspected secondary to 

recurrent hypoglycemia


History of fall last December


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


Continue aspirin 81 mg daily


Continue Plavix 75mg daily


Continue Lipitor 40 mg daily


Neurochecks per protocol


Consult neurology


Consult PT/OT 


Consult speech therapy


Echocardiogram EF of 40 to 45%


Cardiac monitoring


EEG was unremarkable


Follow-up MRI





Diabetes mellitus with hypoglycemia on admission, A1c 6.8


Accu-Cheks per [facility] protocol


Levemir 5 units subcu at bedtime


Low-dose insulin sliding scale


Hypoglycemia precautions





Hypothyroidism


Continue Synthroid 50 mcg daily





Hypertension


Continue Cozaar 100 mg daily





GERD


Continue Pepcid 10 mg IV every 12 hours





F none





E replete as needed





N consistent carbohydrate





A fall precautions





DVT ppx: Heparin 5000 units every 12 hours





Code Status: No code


Discussed with: Patient


Anticipated discharge place: Pending clinical course


Anticipated discharge time: Pending clinical





Attestation


I have seen and examined this patient with my resident , discussed the same with

the resident/STEPHANIE, and  agree with the dictator's assessment and plan as written 





GENERAL: The patient is alert self


HEENT: Pupils are round and equally reacting to light. EOMI. No scleral icterus.

No conjunctival pallor. Normocephalic, atraumatic. No pharyngeal erythema. No 

thyromegaly. 


CARDIOVASCULAR: S1 and S2 present. No murmurs, rubs, or gallops. 


PULMONARY: Chest is clear to auscultation, no wheezing or crackles. 


ABDOMEN: Soft, nontender, nondistended, normoactive bowel sounds. No palpable 

organomegaly. 


MUSCULOSKELETAL: No joint swelling or deformity.


EXTREMITIES: No cyanosis, clubbing, or pedal edema. 


NEUROLOGICAL: Gross neurological examination did not reveal any focal deficits. 


SKIN: No rashes. 








Dr. Dhruv urbina











Objective





- Vital Signs


Vital signs: 


                                   Vital Signs











Temp  98.4 F   02/20/25 02:00


 


Pulse  68   02/20/25 02:00


 


Resp  17   02/20/25 02:00


 


BP  125/70   02/20/25 02:00


 


Pulse Ox  96   02/20/25 02:00


 


FiO2      








                                 Intake & Output











 02/19/25 02/20/25 02/20/25





 18:59 06:59 18:59


 


Intake Total 236  


 


Balance 236  


 


Intake:   


 


  Oral 236  


 


Other:   


 


  Voiding Method Toilet Toilet 


 


  # Voids 2 2 














- Labs


CBC & Chem 7: 


                                 02/20/25 08:20





                                 02/20/25 08:20


Labs: 


                  Abnormal Lab Results - Last 24 Hours (Table)











  02/19/25 02/19/25 02/19/25 Range/Units





  08:28 12:16 17:34 


 


POC Glucose (mg/dL)  155 H  153 H  329 H  ()  mg/dL














  02/19/25 02/20/25 02/20/25 Range/Units





  20:18 06:40 07:31 


 


POC Glucose (mg/dL)  253 H  67 L  167 H  ()  mg/dL

## 2025-02-21 LAB
GLUCOSE BLD-MCNC: 252 MG/DL (ref 70–110)
GLUCOSE BLD-MCNC: 272 MG/DL (ref 70–110)
GLUCOSE BLD-MCNC: 320 MG/DL (ref 70–110)
GLUCOSE BLD-MCNC: 325 MG/DL (ref 70–110)

## 2025-02-21 RX ADMIN — MEMANTINE HYDROCHLORIDE SCH MG: 5 TABLET ORAL at 13:57

## 2025-02-21 NOTE — P.PN
Subjective


Progress Note Date: 02/21/25





Hospital Course: 


This is a pleasant 89 years old female who presents because of episode of 

slurred speech, information were obtained with the help of the daughter at 

bedside


As per daughter her slurred speech resolved by the time EMS came to pick her up 

to the hospital.


Currently she denies any slurred speech blurred vision, weakness or numbness or 

tingling in extremities, no headache or dizziness


Also she slid out of her bed yesterday when she was trying to get up to go to 

the restroom


She denies specific urinary symptoms, no vomiting or diarrhea or abdominal pain.

 No chest pain or dyspnea


Patient also she has a history of forgetfulness, as per daughter patient will 

stand against the pictures talking to them and trying to feed them and would not

go out unless she taken with her


She fell at home last December and she has memory problem, she has been falling 

more over the last 4 once and the daughter is concerned about her safety and she

wants to talk to the 





Vitals are stable blood glucose was low on admission at 62


She has low hemoglobin at 10.9, rest of labs including CBC, BMP LFT and INR were

unremarkable


CT of the brain negative for acute process


Carotid duplex showing 50% stenosis at the bifurcation of both carotid arteries 

more on the right side











2/19/25


Patient seen and examined at bedside.  Per RN, patient was pacing up and down 

the halls yesterday and sat at the nurses station for a few hours.  After given 

some Seroquel, patient fell asleep.  Sitter is present in the room.  Patient 

denies any acute complaints this morning.  She seems to be somewhat confused, 

but not in any acute distress.





2/20/25


Patient seen and examined at bedside.  Sitter present in the room.  Patient is 

still only oriented to self not place or time.  She is resting comfortably in 

bed.  She denies any acute complaints this morning





2/21/25


Patient seen and examined at bedside.  Sitter has been discontinued.  Patient is

oriented to person and place but not time.  She is resting comfortably in bed.  

She denies any complaints this morning.  Patient has been accepted at [facility].

 She is now awaiting prior Auth and will likely be discharged tomorrow.





Pertinent positives and negatives discussed above, a complete review of systems 

was preformed and all the other systems were negative.





Vitals Signs Reviewed.








General: non toxic, no distress, appears at stated age, normal weight


Derm: no unusual rashes/lesions, warm


Head: atraumatic, normocephalic, symmetric


Eyes: EOMI, anicteric sclera, pupils equal round reactive to light


ENT: Nose and ears atraumatic


Neck: No cervical lymphadenopathy, trachea midline, supple


Mouth: no lip lesion, mucus membranes moist


Cardiovascular: S1S2 reg, systolic murmur, positive dorsalis pedis pulse 

bilateral, no edema


Lungs: Equal  air entry bilaterally, no rhonchi, no rales, no accessory muscle 

use


Abdominal: soft,  nontender to palpation, no guarding


Ext: muscle strength 5 out of 5 in all 4 extremities grossly, no gross muscle 

atrophy, no contractures


Neuro:  CN II-XI grossly intact, no gross focal neuro deficits


Psych: Alert to person but not place or time





Data Reviewed Today:





Patient Labs: No new labs





Imaging: No new imaging








Assessment and Plan:





Episode of slurred speech suspicious for TIA, highly suspected secondary to recu

rrent hypoglycemia


History of fall last December


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


Continue aspirin 81 mg daily


Continue Plavix 75mg daily


Continue Lipitor 40 mg daily


Continue donepezil 5 mg p.o. at bedtime


Continue memantine 5 mg daily


Continue Seroquel 12.5 mg p.o. at bedtime


Neurology on board


PT OT on board


Speech therapy on board


Echocardiogram EF of 40 to 45%


Cardiac monitoring


EEG was unremarkable


MRI brain was unremarkable





Diabetes mellitus with hypoglycemia on admission, A1c 6.8


Accu-Cheks per [facility] protocol


Increased Lantus to 7 units subcu at bedtime


Low-dose insulin sliding scale


Hypoglycemia precautions





Hypothyroidism


Continue Synthroid 50 mcg daily





Hypertension


Continue Cozaar 100 mg daily


Add amlodipine 5 mg daily





GERD


Continue Pepcid 10 mg IV every 12 hours





F none





E replete as needed





N consistent carbohydrate





A fall precautions





DVT ppx: Heparin 5000 units every 12 hours





Code Status: No code


Discussed with: Patient


Anticipated discharge place: [facility]


Anticipated discharge time: Likely within 24 hours





Attestation


I have seen and examined this patient with my resident , discussed the same with

the resident/STEPHANIE, and  agree with the dictator's assessment and plan as written 





GENERAL: The patient is alert and oriented x2


HEENT: Pupils are round and equally reacting to light. EOMI. No scleral icterus.

No conjunctival pallor. Normocephalic, atraumatic. No pharyngeal erythema. No 

thyromegaly. 


CARDIOVASCULAR: S1 and S2 present. No murmurs, rubs, or gallops. 


PULMONARY: Chest is clear to auscultation, no wheezing or crackles. 


ABDOMEN: Soft, nontender, nondistended, normoactive bowel sounds. No palpable 

organomegaly. 


MUSCULOSKELETAL: No joint swelling or deformity.


EXTREMITIES: No cyanosis, clubbing, or pedal edema. 


NEUROLOGICAL: Gross neurological examination did not reveal any focal deficits. 


SKIN: No rashes. 








Dr. Dhruv urbina











Objective





- Vital Signs


Vital signs: 


                                   Vital Signs











Temp  97.8 F   02/21/25 07:20


 


Pulse  62   02/21/25 07:20


 


Resp  15   02/21/25 07:20


 


BP  171/76   02/21/25 07:20


 


Pulse Ox  96   02/21/25 07:20


 


FiO2      








                                 Intake & Output











 02/20/25 02/21/25 02/21/25





 18:59 06:59 18:59


 


Intake Total 336  


 


Balance 336  


 


Intake:   


 


  Oral 336  


 


Other:   


 


  Voiding Method Toilet Toilet 


 


  # Voids 3 2 


 


  # Bowel Movements 2  














- Labs


CBC & Chem 7: 


                                 02/20/25 08:20





                                 02/20/25 08:20


Labs: 


                  Abnormal Lab Results - Last 24 Hours (Table)











  02/20/25 02/20/25 02/20/25 Range/Units





  08:20 08:20 12:20 


 


WBC  3.3 L    (3.8-10.6)  k/uL


 


RBC  3.35 L    (3.80-5.40)  m/uL


 


MCV  108.7 H    (80.0-100.0)  fL


 


MCH  35.2 H    (25.0-35.0)  pg


 


Lymphocytes #  0.7 L    (1.0-4.8)  k/uL


 


Sodium   134 L   (137-145)  mmol/L


 


BUN   25 H   (7-17)  mg/dL


 


Glucose   214 H   (74-99)  mg/dL


 


POC Glucose (mg/dL)    361 H  ()  mg/dL














  02/20/25 02/20/25 02/21/25 Range/Units





  17:48 19:56 05:49 


 


WBC     (3.8-10.6)  k/uL


 


RBC     (3.80-5.40)  m/uL


 


MCV     (80.0-100.0)  fL


 


MCH     (25.0-35.0)  pg


 


Lymphocytes #     (1.0-4.8)  k/uL


 


Sodium     (137-145)  mmol/L


 


BUN     (7-17)  mg/dL


 


Glucose     (74-99)  mg/dL


 


POC Glucose (mg/dL)  397 H  327 H  272 H  ()  mg/dL

## 2025-02-21 NOTE — MR
EXAMINATION TYPE: MR brain wo con

 

DATE OF EXAM: 2/21/2025 10:20 AM

 

COMPARISON: CT brain 2/17/2025 

 

CLINICAL INDICATION: Female, 89 years old with history of AMS, TIA, r/o CVA, AMS, TIA, R/O CVA

 

TECHNIQUE: Multiplanar, multiecho imaging on a 3.0 Sherry magnet is performed through the brain.  Stud
y is performed within 24 hours of arrival to the hospital.Multiplanar, multiecho imaging on a 3.0 Angela
la magnet is performed through the knee.  

IV Contrast: mL (None, if empty)

 

FINDINGS:

 

The craniovertebral junction is normal.  The pituitary is normal.  

 

Diffusion-weighted imaging is performed.  No abnormal hyperintensity is present to suggest an acute i
ntracranial infarct or acute ischemic change.

 

There are patchy periventricular white matter hyperintensities likely on the basis of chronic white m
atter ischemic change. 

 

Ventricles and sulci are appropriate for the patient age.

 

There is some fluid within the bilateral mastoid air cells. Correlate for mastoiditis.

 

IMPRESSION:

1. 

1. Chronic appearing periventricular white matter ischemic type changes.

2. No acute intracranial process apparent

 

[facility], Workstation: [id number], 2/21/2025 10:27 AM

## 2025-02-21 NOTE — P.PN
Subjective


Progress Note Date: 02/20/25





Patient was seen for a follow-up.  Patient is laying comfortably in the bed.  

Sitter was present by the bedside.  She mentions that patient ate, walked in the

hallway, talking slightly better, confusion slightly better.  No new concerns.





Objective





- Vital Signs


Vital signs: 


                                   Vital Signs











Temp  97.8 F   02/21/25 07:20


 


Pulse  62   02/21/25 07:20


 


Resp  15   02/21/25 07:20


 


BP  171/76   02/21/25 07:20


 


Pulse Ox  96   02/21/25 07:20


 


FiO2      








                                 Intake & Output











 02/20/25 02/21/25 02/21/25





 18:59 06:59 18:59


 


Intake Total 336  240


 


Balance 336  240


 


Intake:   


 


  Oral 336  240


 


Other:   


 


  Voiding Method Toilet Toilet Toilet


 


  # Voids 3 2 


 


  # Bowel Movements 2  














- Exam





Examination unchanged.  Mentation stable.





- Labs


CBC & Chem 7: 


                                 02/20/25 08:20





                                 02/20/25 08:20


Labs: 


                  Abnormal Lab Results - Last 24 Hours (Table)











  02/20/25 02/20/25 02/20/25 Range/Units





  12:20 17:48 19:56 


 


POC Glucose (mg/dL)  361 H  397 H  327 H  ()  mg/dL














  02/21/25 Range/Units





  05:49 


 


POC Glucose (mg/dL)  272 H  ()  mg/dL














Assessment and Plan


Assessment: 





* Possible TIA


* Progressive memory loss for last 3 years, getting worse.  Probable Alzheimer's

  dementia, at least moderate to severe degree.


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 

  range.


* Hypertension


* Hyperlipidemia


* Coronary artery disease











Plan: 








* Patient is undergoing workup for TIA.


* Await MRI brain, rule out CVA.


* 2-D echo revealed mildly impaired left ventricular function with EF between 40

  to 45%.  Mildly increased septal wall thickness.  Mildly increased posterior 

  wall thickness.  Severely increased left ventricular systolic volume.  Normal 

  right atrial size.  Negative agitated saline bubble study for right-to-left 

  shunt.  Mildly increased left atrial diameter.  Mild aortic stenosis.





* Carotid Doppler, revealed atheromatous plaquing present bilaterally.  There is

  approximately 50% narrowing at the bilateral carotid bifurcations, slightly 

  greater on the left than right.  Antegrade flow in both vertebral arteries.


* Fasting a.m. lipid panel, with cholesterol 117, LDL 55, HDL 51 and 

  triglycerides 52.  Continue Lipitor 40 mg daily (home dose).


* Hemoglobin A1c 6.8, diabetes well-controlled.  Avoid episodes of hypoglycemia.


* B12 554 on 2/26/2024 and folate > 20, TSH 3.94


* Optimize control of blood pressure.


* EEG was normal awake and drowsy for patient's age.  No focal, lateralized or 

  epileptiform activity was seen.


* Continue Plavix 75 mg daily (home dose)


* Agree with starting donepezil 5 mg daily for dementia.  After 1 month, 

  increase dose to 10 mg.  Patient probably will need Namenda in the future.





* Telemetry monitoring rule out any arrhythmia


* PT, OT, speech therapy


* DVT prophylaxis: Heparin 5000 units subcu every 12 hours


* Neurologically clear, if the MRI comes back normal.

## 2025-02-22 VITALS
SYSTOLIC BLOOD PRESSURE: 139 MMHG | RESPIRATION RATE: 15 BRPM | DIASTOLIC BLOOD PRESSURE: 57 MMHG | HEART RATE: 58 BPM | TEMPERATURE: 97.9 F

## 2025-02-22 LAB
GLUCOSE BLD-MCNC: 298 MG/DL (ref 70–110)
GLUCOSE BLD-MCNC: 83 MG/DL (ref 70–110)

## 2025-02-22 NOTE — P.DS
Providers


Date of admission: 


02/17/25 15:01





Expected date of discharge: 02/22/25


Attending physician: 


Zoila Bernardo





Consults: 





                                        





02/17/25 15:02


Consult Physician Routine 


   Consulting Provider: Sheri Iglesias


   Consult Reason/Comments: tia


   Do you want consulting provider notified?: Yes











Primary care physician: 


Zac Amos





[facility] Course: 





This is a pleasant 89 years old female who presents because of episode of 

slurred speech, information were obtained with the help of the daughter at 

bedside


As per daughter her slurred speech resolved by the time EMS came to pick her up 

to the hospital.


Currently she denies any slurred speech blurred vision, weakness or numbness or 

tingling in extremities, no headache or dizziness


Also she slid out of her bed yesterday when she was trying to get up to go to 

the restroom


She denies specific urinary symptoms, no vomiting or diarrhea or abdominal pain.

 No chest pain or dyspnea


Patient also she has a history of forgetfulness, as per daughter patient will 

stand against the pictures talking to them and trying to feed them and would not

go out unless she taken with her


She fell at home last December and she has memory problem, she has been falling 

more over the last 4 once and the daughter is concerned about her safety and she

wants to talk to the 





Vitals are stable blood glucose was low on admission at 62


She has low hemoglobin at 10.9, rest of labs including CBC, BMP LFT and INR were

unremarkable


CT of the brain negative for acute process


Carotid duplex showing 50% stenosis at the bifurcation of both carotid arteries 

more on the right side





Discharge diagnoses;


Suspected TIA


Probable Alzheimer's dementia


Diabetes mellitus with hyperglycemia admission


History of fall


Bilateral carotid artery stenosis


Chronic anemia


Hypothyroidism


Hypertension


Hyperlipidemia


Coronary artery disease





2/22/2025


Patient seen and examined at bedside today.  She is resting comfortably in bed 

and denies any acute complaints.  She remains oriented to only person but not 

place or time.





Patient will be discharged to [facility] today.  Patient is advised to be 

compliant with medications. Patient is advised to follow-up with PCP in 1 to 2 

days.





Physical Exam:


General: non toxic, no distress, appears at stated age, normal weight


Derm: Multiple ecchymosis noted on bilateral upper extremities


Head: atraumatic, normocephalic, symmetric


Eyes: EOMI, anicteric sclera, pupils equal round reactive to light


ENT: Nose and ears atraumatic


Neck:  No cervical lymphadenopathy, trachea midline, supple


Mouth: no lip lesion, mucus membranes moist


Cardiovascular: S1S2 reg, no murmur, positive dorsalis pedis pulse bilateral, no

edema


Lungs: Equal  air entry bilaterally, no rhonchi, no rales, no accessory muscle 

use


Abdominal: Soft, nontender, non-distended


Extremities: fingers appear swollen without clubbing, no edema


Neuro:  CN II-XI grossly intact, no gross focal neuro deficits


Psych: Alert, oriented to person but not place or time, appropriate affect





Dictation was produced using dragon dictation software. please excuse any 

grammatical, word or spelling errors.





A total of minutes of 20 minutes were spent preparing this complex discharge 

summary.


Patient was discharged on 2/2012/25 at 1245.





 


Attestation


I have seen and examined this patient with my resident , discussed the same with

the resident/STEPHANIE, and  agree with the dictator's assessment and plan as written 





GENERAL: The patient is alert and oriented x 2-3, not in any acute distress. 

Well developed, well nourished. 


HEENT: Pupils are round and equally reacting to light. EOMI. No scleral icterus.

No conjunctival pallor. Normocephalic, atraumatic. No pharyngeal erythema. No 

thyromegaly. 


CARDIOVASCULAR: S1 and S2 present. No murmurs, rubs, or gallops. 


PULMONARY: Chest is clear to auscultation, no wheezing or crackles. 


ABDOMEN: Soft, nontender, nondistended, normoactive bowel sounds. No palpable 

organomegaly. 


MUSCULOSKELETAL: No joint swelling or deformity.


EXTREMITIES: No cyanosis, clubbing, or pedal edema. 


NEUROLOGICAL: Gross neurological examination did not reveal any focal deficits. 


SKIN: No rashes. 








Dr. Dhruv urbina











Patient Condition at Discharge: Fair





Plan - Discharge Summary


New Discharge Prescriptions: 


New


   Memantine [Namenda] 5 mg PO DAILY 30 Days #30 tab


   INSULIN ASPART (NovoLOG) [NovoLOG (formulary)] 0 unit SQ ACHS  each


   Donepezil [Aricept] 5 mg PO HS 30 Days #30 tab


   Insulin Glargine (Lantus) [Lantus Vial] 7 unit SQ HS  each





Continue


   Levothyroxine Sodium [Synthroid] 50 mcg PO DAILY


   Clopidogrel [Plavix] 75 mg PO DAILY


   Atorvastatin [Lipitor] 40 mg PO DAILY


   Losartan Potassium [Cozaar] 100 mg PO DAILY


   Glucagon [Gvoke Pfs 1-Pack Syringe] 1 mg SQ AS DIRECTED PRN


     PRN Reason: Hypoglycemia





Discontinued


   INSULIN LISPRO (HumaLOG) [humaLOG] 5 units SQ AC-TID


   Insulin Glargine (Lantus) [Lantus Vial] 12 unit SQ HS


Discharge Medication List





Atorvastatin [Lipitor] 40 mg PO DAILY 11/22/22 [History]


Clopidogrel [Plavix] 75 mg PO DAILY 11/22/22 [History]


Levothyroxine Sodium [Synthroid] 50 mcg PO DAILY 11/22/22 [History]


Glucagon [Gvoke Pfs 1-Pack Syringe] 1 mg SQ AS DIRECTED PRN 02/17/25 [History]


Losartan Potassium [Cozaar] 100 mg PO DAILY 02/17/25 [History]


Donepezil [Aricept] 5 mg PO HS 30 Days #30 tab 02/22/25 [Rx]


INSULIN ASPART (NovoLOG) [NovoLOG (formulary)] 0 unit SQ ACHS  each 02/22/25 

[Rx]


Insulin Glargine (Lantus) [Lantus Vial] 7 unit SQ HS  each 02/22/25 [Rx]


Memantine [Namenda] 5 mg PO DAILY 30 Days #30 tab 02/22/25 [Rx]








Follow up Appointment(s)/Referral(s): 


Zac Amos MD [Primary Care Provider] - 1-2 Days


Discharge Disposition: TRANSFER TO SNF/ECF

## 2025-02-23 NOTE — P.PN
Subjective


Progress Note Date: 02/21/25





Patient was seen for a follow-up.  Patient is laying comfortably in the bed.  

Sitter was present by the bedside.  No new concerns.





Objective





- Vital Signs


Vital signs: 


                                   Vital Signs











Temp  97.8 F   02/21/25 07:20


 


Pulse  62   02/21/25 07:20


 


Resp  15   02/21/25 07:20


 


BP  171/76   02/21/25 07:20


 


Pulse Ox  96   02/21/25 07:20


 


FiO2      








                                 Intake & Output











 02/20/25 02/21/25 02/21/25





 18:59 06:59 18:59


 


Intake Total 336  240


 


Balance 336  240


 


Intake:   


 


  Oral 336  240


 


Other:   


 


  Voiding Method Toilet Toilet Toilet


 


  # Voids 3 2 


 


  # Bowel Movements 2  














- Exam





Patient is alert and awake, pleasantly confused.  Patient could not tell the 

month.  She states that she is 81 years old.  Patient states is the month is 

February 19 something.  She then looked at the notice board in front and then 

told it is February 25.  I suspect patient saw the month on the noticed board as

well.





Rest of the examination is nonfocal.





- Labs


CBC & Chem 7: 


                                 02/20/25 08:20





                                 02/20/25 08:20


Labs: 


                  Abnormal Lab Results - Last 24 Hours (Table)











  02/20/25 02/20/25 02/20/25 Range/Units





  12:20 17:48 19:56 


 


POC Glucose (mg/dL)  361 H  397 H  327 H  ()  mg/dL














  02/21/25 02/21/25 Range/Units





  05:49 12:06 


 


POC Glucose (mg/dL)  272 H  252 H  ()  mg/dL














Assessment and Plan


Assessment: 





* Possible TIA


* Progressive memory loss for last 3 years, getting worse.  Probable Alzheimer's

  dementia, at least moderate to severe degree.


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 

  range.


* Hypertension


* Hyperlipidemia


* Coronary artery disease











Plan: 








* Patient is undergoing workup for TIA.


* MRI of the brain revealed chronic appearing periventricular white matter 

  ischemic type changes.  No acute intracranial process.  I personally reviewed 

  MRI agree with the findings.


* 2-D echo revealed mildly impaired left ventricular function with EF between 40

  to 45%.  Mildly increased septal wall thickness.  Mildly increased posterior 

  wall thickness.  Severely increased left ventricular systolic volume.  Normal 

  right atrial size.  Negative agitated saline bubble study for right-to-left 

  shunt.  Mildly increased left atrial diameter.  Mild aortic stenosis.





* Carotid Doppler, revealed atheromatous plaquing present bilaterally.  There is

  approximately 50% narrowing at the bilateral carotid bifurcations, slightly 

  greater on the left than right.  Antegrade flow in both vertebral arteries.


* Fasting a.m. lipid panel, with cholesterol 117, LDL 55, HDL 51 and 

  triglycerides 52.  Continue Lipitor 40 mg daily (home dose).


* Hemoglobin A1c 6.8, diabetes well-controlled.  Avoid episodes of hypoglycemia.


* B12 554 on 2/26/2024 and folate > 20, TSH 3.94


* Optimize control of blood pressure.


* EEG was normal awake and drowsy for patient's age.  No focal, lateralized or 

  epileptiform activity was seen.


* Continue Plavix 75 mg daily (home dose)


* Agree with starting donepezil 5 mg daily for dementia.  After 1 month, in

  crease dose to 10 mg.  I also started patient on Namenda 5 mg once daily.  

  Need to optimize dose by neurologist outpatient.





* Telemetry monitoring rule out any arrhythmia


* PT, OT, speech therapy


* DVT prophylaxis: Heparin 5000 units subcu every 12 hours


* Neurologically clear for discharge.  Recommend patient follow-up with 

  neurologist outpatient.